# Patient Record
Sex: MALE
[De-identification: names, ages, dates, MRNs, and addresses within clinical notes are randomized per-mention and may not be internally consistent; named-entity substitution may affect disease eponyms.]

---

## 2017-06-14 PROBLEM — Z00.00 ENCOUNTER FOR PREVENTIVE HEALTH EXAMINATION: Status: ACTIVE | Noted: 2017-06-14

## 2017-06-15 ENCOUNTER — OTHER (OUTPATIENT)
Age: 22
End: 2017-06-15

## 2017-06-26 ENCOUNTER — APPOINTMENT (OUTPATIENT)
Dept: INTERNAL MEDICINE | Facility: CLINIC | Age: 22
End: 2017-06-26

## 2017-06-29 ENCOUNTER — LABORATORY RESULT (OUTPATIENT)
Age: 22
End: 2017-06-29

## 2017-06-29 ENCOUNTER — OTHER (OUTPATIENT)
Age: 22
End: 2017-06-29

## 2017-06-30 ENCOUNTER — APPOINTMENT (OUTPATIENT)
Dept: PEDIATRIC ALLERGY IMMUNOLOGY | Facility: CLINIC | Age: 22
End: 2017-06-30

## 2017-06-30 VITALS
SYSTOLIC BLOOD PRESSURE: 125 MMHG | HEIGHT: 65 IN | BODY MASS INDEX: 24.16 KG/M2 | OXYGEN SATURATION: 97 % | WEIGHT: 145 LBS | HEART RATE: 94 BPM | DIASTOLIC BLOOD PRESSURE: 81 MMHG

## 2017-06-30 DIAGNOSIS — Z86.39 PERSONAL HISTORY OF OTHER ENDOCRINE, NUTRITIONAL AND METABOLIC DISEASE: ICD-10-CM

## 2017-06-30 DIAGNOSIS — Z78.9 OTHER SPECIFIED HEALTH STATUS: ICD-10-CM

## 2017-06-30 DIAGNOSIS — Z82.49 FAMILY HISTORY OF ISCHEMIC HEART DISEASE AND OTHER DISEASES OF THE CIRCULATORY SYSTEM: ICD-10-CM

## 2017-06-30 DIAGNOSIS — Z80.1 FAMILY HISTORY OF MALIGNANT NEOPLASM OF TRACHEA, BRONCHUS AND LUNG: ICD-10-CM

## 2017-06-30 RX ORDER — POLYETHYLENE GLYCOL 3350 17 G
POWDER IN PACKET (EA) ORAL
Refills: 0 | Status: ACTIVE | COMMUNITY

## 2017-06-30 RX ORDER — MULTIVITAMIN
TABLET ORAL
Refills: 0 | Status: ACTIVE | COMMUNITY

## 2017-07-06 LAB
25(OH)D3 SERPL-MCNC: 32.6 NG/ML
ALBUMIN SERPL ELPH-MCNC: 4.8 G/DL
ALP BLD-CCNC: 83 U/L
ALT SERPL-CCNC: 14 U/L
ANION GAP SERPL CALC-SCNC: 15 MMOL/L
APPEARANCE: CLEAR
AST SERPL-CCNC: 13 U/L
BASOPHILS # BLD AUTO: 0.02 K/UL
BASOPHILS NFR BLD AUTO: 0.2 %
BILIRUB SERPL-MCNC: 0.3 MG/DL
BILIRUBIN URINE: NEGATIVE
BLOOD URINE: NEGATIVE
BUN SERPL-MCNC: 11 MG/DL
C TRACH RRNA SPEC QL NAA+PROBE: NORMAL
CALCIUM SERPL-MCNC: 9.9 MG/DL
CHLORIDE SERPL-SCNC: 103 MMOL/L
CHOLEST SERPL-MCNC: 177 MG/DL
CHOLEST/HDLC SERPL: 3.2 RATIO
CO2 SERPL-SCNC: 23 MMOL/L
COLOR: YELLOW
CREAT SERPL-MCNC: 0.81 MG/DL
EOSINOPHIL # BLD AUTO: 0.83 K/UL
EOSINOPHIL NFR BLD AUTO: 9.8 %
ESTRADIOL SERPL-MCNC: 78 PG/ML
FSH SERPL-MCNC: 2.4 IU/L
GLUCOSE QUALITATIVE U: NORMAL MG/DL
GLUCOSE SERPL-MCNC: 94 MG/DL
HAV IGG+IGM SER QL: REACTIVE
HBA1C MFR BLD HPLC: 5.5 %
HBV CORE IGG+IGM SER QL: NONREACTIVE
HBV SURFACE AB SERPL IA-ACNC: 954 MIU/ML
HBV SURFACE AG SER QL: NONREACTIVE
HCT VFR BLD CALC: 44.9 %
HCV AB SER QL: NONREACTIVE
HCV S/CO RATIO: 0.12 S/CO
HDLC SERPL-MCNC: 55 MG/DL
HGB BLD-MCNC: 14.3 G/DL
HIV1+2 AB SPEC QL IA.RAPID: NONREACTIVE
IMM GRANULOCYTES NFR BLD AUTO: 0.1 %
KETONES URINE: NEGATIVE
LDLC SERPL CALC-MCNC: 109 MG/DL
LEUKOCYTE ESTERASE URINE: ABNORMAL
LH SERPL-ACNC: 9.4 IU/L
LYMPHOCYTES # BLD AUTO: 2.12 K/UL
LYMPHOCYTES NFR BLD AUTO: 25.1 %
MAN DIFF?: NORMAL
MCHC RBC-ENTMCNC: 29.2 PG
MCHC RBC-ENTMCNC: 31.8 GM/DL
MCV RBC AUTO: 91.8 FL
MONOCYTES # BLD AUTO: 0.36 K/UL
MONOCYTES NFR BLD AUTO: 4.3 %
N GONORRHOEA RRNA SPEC QL NAA+PROBE: NORMAL
NEUTROPHILS # BLD AUTO: 5.1 K/UL
NEUTROPHILS NFR BLD AUTO: 60.5 %
NITRITE URINE: NEGATIVE
PH URINE: 6.5
PLATELET # BLD AUTO: 284 K/UL
POTASSIUM SERPL-SCNC: 4.8 MMOL/L
PROT SERPL-MCNC: 7.5 G/DL
PROTEIN URINE: NEGATIVE MG/DL
RBC # BLD: 4.89 M/UL
RBC # FLD: 13.2 %
SODIUM SERPL-SCNC: 141 MMOL/L
SOURCE AMPLIFICATION: NORMAL
SPECIFIC GRAVITY URINE: 1.03
T PALLIDUM AB SER QL IA: NEGATIVE
TESTOST SERPL-MCNC: 56.3 NG/DL
TRIGL SERPL-MCNC: 65 MG/DL
TSH SERPL-ACNC: 1.4 UIU/ML
UROBILINOGEN URINE: NORMAL MG/DL
WBC # FLD AUTO: 8.44 K/UL

## 2017-07-07 ENCOUNTER — TRANSCRIPTION ENCOUNTER (OUTPATIENT)
Age: 22
End: 2017-07-07

## 2017-07-10 LAB — DHEA-SULFATE, SERUM: 392 UG/DL

## 2017-07-21 ENCOUNTER — OTHER (OUTPATIENT)
Age: 22
End: 2017-07-21

## 2017-08-04 ENCOUNTER — TRANSCRIPTION ENCOUNTER (OUTPATIENT)
Age: 22
End: 2017-08-04

## 2017-09-08 ENCOUNTER — APPOINTMENT (OUTPATIENT)
Dept: ENDOCRINOLOGY | Facility: CLINIC | Age: 22
End: 2017-09-08
Payer: COMMERCIAL

## 2017-09-08 VITALS
DIASTOLIC BLOOD PRESSURE: 78 MMHG | HEIGHT: 65 IN | SYSTOLIC BLOOD PRESSURE: 120 MMHG | WEIGHT: 148 LBS | BODY MASS INDEX: 24.66 KG/M2 | HEART RATE: 105 BPM | OXYGEN SATURATION: 98 %

## 2017-09-08 PROCEDURE — 99204 OFFICE O/P NEW MOD 45 MIN: CPT

## 2017-09-08 RX ORDER — OMEGA-3/DHA/EPA/FISH OIL 300-1000MG
1000 CAPSULE ORAL
Refills: 0 | Status: ACTIVE | COMMUNITY
Start: 2017-09-08

## 2017-09-11 ENCOUNTER — APPOINTMENT (OUTPATIENT)
Dept: ENDOCRINOLOGY | Facility: CLINIC | Age: 22
End: 2017-09-11
Payer: COMMERCIAL

## 2017-09-11 PROCEDURE — 96372 THER/PROPH/DIAG INJ SC/IM: CPT

## 2017-09-11 RX ADMIN — TESTOSTERONE ENANTHATE 0 MG/ML: 200 INJECTION, SOLUTION INTRAMUSCULAR at 00:00

## 2017-09-12 RX ORDER — TESTOSTERONE ENANTHATE 200 MG/ML
200 INJECTION, SOLUTION INTRAMUSCULAR
Refills: 0 | Status: COMPLETED | OUTPATIENT
Start: 2017-09-11

## 2017-09-18 ENCOUNTER — LABORATORY RESULT (OUTPATIENT)
Age: 22
End: 2017-09-18

## 2017-09-19 LAB
BASOPHILS # BLD AUTO: 0.03 K/UL
BASOPHILS NFR BLD AUTO: 0.5 %
EOSINOPHIL # BLD AUTO: 0.72 K/UL
EOSINOPHIL NFR BLD AUTO: 10.9 %
HCT VFR BLD CALC: 44.2 %
HGB BLD-MCNC: 14.2 G/DL
IMM GRANULOCYTES NFR BLD AUTO: 0.2 %
LYMPHOCYTES # BLD AUTO: 2.46 K/UL
LYMPHOCYTES NFR BLD AUTO: 37.4 %
MAN DIFF?: NORMAL
MCHC RBC-ENTMCNC: 28.7 PG
MCHC RBC-ENTMCNC: 32.1 GM/DL
MCV RBC AUTO: 89.3 FL
MONOCYTES # BLD AUTO: 0.31 K/UL
MONOCYTES NFR BLD AUTO: 4.7 %
NEUTROPHILS # BLD AUTO: 3.05 K/UL
NEUTROPHILS NFR BLD AUTO: 46.3 %
PLATELET # BLD AUTO: 290 K/UL
RBC # BLD: 4.95 M/UL
RBC # FLD: 12.8 %
TESTOST SERPL-MCNC: 563.7 NG/DL
WBC # FLD AUTO: 6.58 K/UL

## 2017-09-25 ENCOUNTER — APPOINTMENT (OUTPATIENT)
Dept: ENDOCRINOLOGY | Facility: CLINIC | Age: 22
End: 2017-09-25
Payer: COMMERCIAL

## 2017-09-25 PROCEDURE — 96372 THER/PROPH/DIAG INJ SC/IM: CPT

## 2017-09-25 RX ADMIN — TESTOSTERONE ENANTHATE 0 MG/ML: 200 INJECTION, SOLUTION INTRAMUSCULAR at 00:00

## 2017-09-26 RX ORDER — TESTOSTERONE ENANTHATE 200 MG/ML
200 INJECTION, SOLUTION INTRAMUSCULAR
Refills: 0 | Status: COMPLETED | OUTPATIENT
Start: 2017-09-25

## 2017-10-06 ENCOUNTER — APPOINTMENT (OUTPATIENT)
Dept: ENDOCRINOLOGY | Facility: CLINIC | Age: 22
End: 2017-10-06
Payer: COMMERCIAL

## 2017-10-06 VITALS
DIASTOLIC BLOOD PRESSURE: 70 MMHG | WEIGHT: 144 LBS | HEIGHT: 65 IN | HEART RATE: 97 BPM | SYSTOLIC BLOOD PRESSURE: 102 MMHG | OXYGEN SATURATION: 98 % | BODY MASS INDEX: 23.99 KG/M2

## 2017-10-06 PROCEDURE — 99213 OFFICE O/P EST LOW 20 MIN: CPT

## 2017-10-09 ENCOUNTER — APPOINTMENT (OUTPATIENT)
Dept: ENDOCRINOLOGY | Facility: CLINIC | Age: 22
End: 2017-10-09
Payer: COMMERCIAL

## 2017-10-09 PROCEDURE — 96372 THER/PROPH/DIAG INJ SC/IM: CPT

## 2017-10-09 RX ADMIN — TESTOSTERONE ENANTHATE 0 MG/ML: 200 INJECTION, SOLUTION INTRAMUSCULAR at 00:00

## 2017-10-10 RX ORDER — TESTOSTERONE ENANTHATE 200 MG/ML
200 INJECTION, SOLUTION INTRAMUSCULAR
Refills: 0 | Status: COMPLETED | OUTPATIENT
Start: 2017-10-09

## 2017-10-11 ENCOUNTER — APPOINTMENT (OUTPATIENT)
Dept: ENDOCRINOLOGY | Facility: CLINIC | Age: 22
End: 2017-10-11

## 2017-10-18 ENCOUNTER — TRANSCRIPTION ENCOUNTER (OUTPATIENT)
Age: 22
End: 2017-10-18

## 2017-10-19 ENCOUNTER — TRANSCRIPTION ENCOUNTER (OUTPATIENT)
Age: 22
End: 2017-10-19

## 2017-10-20 LAB
BASOPHILS # BLD AUTO: 0.02 K/UL
BASOPHILS NFR BLD AUTO: 0.3 %
EOSINOPHIL # BLD AUTO: 0.71 K/UL
EOSINOPHIL NFR BLD AUTO: 9.1 %
HCT VFR BLD CALC: 48.4 %
HGB BLD-MCNC: 16 G/DL
IMM GRANULOCYTES NFR BLD AUTO: 0.1 %
LYMPHOCYTES # BLD AUTO: 2.51 K/UL
LYMPHOCYTES NFR BLD AUTO: 32.1 %
MAN DIFF?: NORMAL
MCHC RBC-ENTMCNC: 30.3 PG
MCHC RBC-ENTMCNC: 33.1 GM/DL
MCV RBC AUTO: 91.7 FL
MONOCYTES # BLD AUTO: 0.34 K/UL
MONOCYTES NFR BLD AUTO: 4.3 %
NEUTROPHILS # BLD AUTO: 4.23 K/UL
NEUTROPHILS NFR BLD AUTO: 54.1 %
PLATELET # BLD AUTO: 252 K/UL
RBC # BLD: 5.28 M/UL
RBC # FLD: 13.1 %
TESTOST SERPL-MCNC: 232.7 NG/DL
WBC # FLD AUTO: 7.82 K/UL

## 2017-10-23 ENCOUNTER — APPOINTMENT (OUTPATIENT)
Dept: ENDOCRINOLOGY | Facility: CLINIC | Age: 22
End: 2017-10-23
Payer: COMMERCIAL

## 2017-10-23 PROCEDURE — 96372 THER/PROPH/DIAG INJ SC/IM: CPT

## 2017-10-23 RX ORDER — TESTOSTERONE ENANTHATE 200 MG/ML
200 INJECTION, SOLUTION INTRAMUSCULAR
Refills: 0 | Status: COMPLETED | OUTPATIENT
Start: 2017-10-23

## 2017-10-23 RX ADMIN — TESTOSTERONE ENANTHATE 0 MG/ML: 200 INJECTION, SOLUTION INTRAMUSCULAR at 00:00

## 2017-10-30 ENCOUNTER — MEDICATION RENEWAL (OUTPATIENT)
Age: 22
End: 2017-10-30

## 2017-11-07 ENCOUNTER — TRANSCRIPTION ENCOUNTER (OUTPATIENT)
Age: 22
End: 2017-11-07

## 2017-11-08 RX ORDER — NEEDLES, SAFETY 18GX1 1/2"
21G X 1" NEEDLE, DISPOSABLE MISCELLANEOUS
Qty: 1 | Refills: 5 | Status: DISCONTINUED | COMMUNITY
Start: 2017-09-25 | End: 2017-11-08

## 2017-11-22 ENCOUNTER — TRANSCRIPTION ENCOUNTER (OUTPATIENT)
Age: 22
End: 2017-11-22

## 2017-11-29 ENCOUNTER — TRANSCRIPTION ENCOUNTER (OUTPATIENT)
Age: 22
End: 2017-11-29

## 2017-12-08 ENCOUNTER — APPOINTMENT (OUTPATIENT)
Dept: ENDOCRINOLOGY | Facility: CLINIC | Age: 22
End: 2017-12-08

## 2017-12-28 ENCOUNTER — TRANSCRIPTION ENCOUNTER (OUTPATIENT)
Age: 22
End: 2017-12-28

## 2018-01-25 ENCOUNTER — TRANSCRIPTION ENCOUNTER (OUTPATIENT)
Age: 23
End: 2018-01-25

## 2018-01-30 ENCOUNTER — TRANSCRIPTION ENCOUNTER (OUTPATIENT)
Age: 23
End: 2018-01-30

## 2018-02-06 ENCOUNTER — TRANSCRIPTION ENCOUNTER (OUTPATIENT)
Age: 23
End: 2018-02-06

## 2018-02-14 ENCOUNTER — TRANSCRIPTION ENCOUNTER (OUTPATIENT)
Age: 23
End: 2018-02-14

## 2018-02-26 ENCOUNTER — TRANSCRIPTION ENCOUNTER (OUTPATIENT)
Age: 23
End: 2018-02-26

## 2018-03-27 ENCOUNTER — OTHER (OUTPATIENT)
Age: 23
End: 2018-03-27

## 2018-07-12 ENCOUNTER — OTHER (OUTPATIENT)
Age: 23
End: 2018-07-12

## 2020-06-19 ENCOUNTER — TRANSCRIPTION ENCOUNTER (OUTPATIENT)
Age: 25
End: 2020-06-19

## 2021-01-22 ENCOUNTER — APPOINTMENT (OUTPATIENT)
Dept: RADIOLOGY | Facility: IMAGING CENTER | Age: 26
End: 2021-01-22
Payer: COMMERCIAL

## 2021-01-22 ENCOUNTER — OUTPATIENT (OUTPATIENT)
Dept: OUTPATIENT SERVICES | Facility: HOSPITAL | Age: 26
LOS: 1 days | End: 2021-01-22
Payer: COMMERCIAL

## 2021-01-22 DIAGNOSIS — R62.52 SHORT STATURE (CHILD): ICD-10-CM

## 2021-01-22 PROCEDURE — 73552 X-RAY EXAM OF FEMUR 2/>: CPT

## 2021-01-22 PROCEDURE — 73552 X-RAY EXAM OF FEMUR 2/>: CPT | Mod: 26,50

## 2021-03-02 ENCOUNTER — OUTPATIENT (OUTPATIENT)
Dept: OUTPATIENT SERVICES | Facility: HOSPITAL | Age: 26
LOS: 1 days | End: 2021-03-02
Payer: COMMERCIAL

## 2021-03-02 ENCOUNTER — APPOINTMENT (OUTPATIENT)
Dept: RADIOLOGY | Facility: IMAGING CENTER | Age: 26
End: 2021-03-02
Payer: COMMERCIAL

## 2021-03-02 DIAGNOSIS — R62.52 SHORT STATURE (CHILD): ICD-10-CM

## 2021-03-02 PROCEDURE — 73552 X-RAY EXAM OF FEMUR 2/>: CPT | Mod: 26,50

## 2021-03-02 PROCEDURE — 73552 X-RAY EXAM OF FEMUR 2/>: CPT

## 2021-04-03 ENCOUNTER — APPOINTMENT (OUTPATIENT)
Dept: RADIOLOGY | Facility: IMAGING CENTER | Age: 26
End: 2021-04-03
Payer: COMMERCIAL

## 2021-04-03 ENCOUNTER — OUTPATIENT (OUTPATIENT)
Dept: OUTPATIENT SERVICES | Facility: HOSPITAL | Age: 26
LOS: 1 days | End: 2021-04-03
Payer: COMMERCIAL

## 2021-04-03 DIAGNOSIS — Z00.8 ENCOUNTER FOR OTHER GENERAL EXAMINATION: ICD-10-CM

## 2021-04-03 PROCEDURE — 73552 X-RAY EXAM OF FEMUR 2/>: CPT

## 2021-04-03 PROCEDURE — 73552 X-RAY EXAM OF FEMUR 2/>: CPT | Mod: 26,50

## 2021-07-22 ENCOUNTER — NON-APPOINTMENT (OUTPATIENT)
Age: 26
End: 2021-07-22

## 2021-07-22 ENCOUNTER — APPOINTMENT (OUTPATIENT)
Dept: TRANSGENDER CARE | Facility: CLINIC | Age: 26
End: 2021-07-22

## 2021-07-23 ENCOUNTER — LABORATORY RESULT (OUTPATIENT)
Age: 26
End: 2021-07-23

## 2021-07-23 ENCOUNTER — APPOINTMENT (OUTPATIENT)
Dept: TRANSGENDER CARE | Facility: CLINIC | Age: 26
End: 2021-07-23
Payer: COMMERCIAL

## 2021-07-23 VITALS
SYSTOLIC BLOOD PRESSURE: 110 MMHG | BODY MASS INDEX: 23.04 KG/M2 | HEART RATE: 95 BPM | OXYGEN SATURATION: 98 % | WEIGHT: 152 LBS | DIASTOLIC BLOOD PRESSURE: 80 MMHG | HEIGHT: 68 IN | TEMPERATURE: 98 F

## 2021-07-23 PROCEDURE — 36415 COLL VENOUS BLD VENIPUNCTURE: CPT

## 2021-07-23 PROCEDURE — 99205 OFFICE O/P NEW HI 60 MIN: CPT | Mod: 25

## 2021-07-23 PROCEDURE — 99072 ADDL SUPL MATRL&STAF TM PHE: CPT

## 2021-07-23 NOTE — HISTORY OF PRESENT ILLNESS
[FreeTextEntry1] : GARY TERRAZAS is a 26 year old, transmale seen on 07/23/2021 for  Trans care.\par \par Preferred Pronouns: he/him\par Sexual orientation: straight\par Gender identity: transmale\par Assigned female at birth\par Terms for Body parts: medical ones\par Call pt: Gary\par \par Pt was seen here intially in 2017 and was in care with Dr. Lujan after seeing me then, but moved to [place] in 2018, when he found a new endocrinologist in [place]. \par \par The Pt was taking Testosterone cypionate 200 mg/ml at 0.25 ml weekly, and his testosterone level was 600-700 and Hgb was 16.6 mg/dl.  His endocrinologist changed dose to testosterone cypionate 200 mg/ml 0.20 ml weekly, and intially the Hgb decreased, but his testosterone level was closer to 300-400.  So they increased levels back to 200 mg/ml 0.23 ml weekly, and he has been donating blood once every 2 months for the past 6 months.  \par \par Currently, the patient is feeling well. Has no complaints except for hair loss, and feels good.  He' shere today to reestablish care at the center and also to have labs checked today.\par \par Transition history (Social, Endo, Surgical):\par Pt knew at a very young age that he was male. Since elementary school or before that, he refused to wear skirts and girls clothes even as a 3 and 4 year old. He's always dressed male like. The patient learned the word "transgender" and came out around age 12. The family was supportive and not really surprised. Mom and dad were initially sad, but eventually they thought that he was a normal kid after they spoke with their American friends. Dad does not support starting hormone therapy, but it is "up to him" after he starts work and can pay for therapy by himself. The patient has [employer] student insurance, and it expires in mid-August. Lives with his mom, who is supportive. \par \par Pt went to [employer] and graduated and is living with his mom.\par \par Born: [place] [facility]. Imm[place]rated to [place] 2009. \par • Transition HX + Present Life: Gary felt he was a boy from a young age but didn’t know what it meant. He repressed feelings of gender identity until 2015. Recognized he identified with masculinity and came out to family. His family was not supportive, and it took them a month to reach out to him. They are now affirming and encouraging. He has no siblings. He has known his wife for 6 years,  for 1.5 yrs. She didn’t he was trans at first as he had top surgery when they met. He eventually came out to her and she was understanding. He moved to [place] last year and recently returned to [place]. His wife still lives in [place]. He now lives with his parents. Confirmed he is safe, has food security and reliable transportation. \par \par • Education | Employment: Holds a bachelor’s degree in accounting and business. Employed full time as a . Works remotely. Not out at work. \par \par • Mental Health: Denies mental health, behavioral or developmental concerns. No mental health engagement. He is not interested in talk therapy. Denies inpatient/outpatient admissions. No psych medications. Denies SI/HI or plans. Denies hx of violence. Denies sleeping problems. Denies notable family psych hx. \par \par • Endocrinology & Primary Care: On GAHT for over 4 years. [facility] then pt relocated for work. . Reports he may need to adjust dosing.  No PCP/GYN care. \par \par • Coping: Receives emotional support from family, wife, and friends. \par \par • Feelings of Gender Dysphoria/ Body Dysmorphia: Denies experiencing gender dysphoria or body dysmorphia. \par \par • Gender Presentation: Presents masculine. Had top surgery, no packing. \par \par • Tattoos/ Piercing: None. \par \par • Cigarette, Vaping, Marijuana, Alcohol, and Drug Use: No use. \par \par • Reproductive Endocrinology: He is not sure if he wants to be a parent or have biological children.   Does not want to become pregnant , and is not intersted in egg banking. \par \par • Gender Affirming Surgery: Top surgery in 2015, Dr. Clemente Quinteros. Satisfied with results. No plans for future surgeries. \par \par • Name Change + Gender Marker: Completed legal name change in 2015. Interested in gender marker change. \par \par • Nutrition: Denies nutritional concerns, appetite problems or hx of ED. 3 meals per day. No exercise, 5’8 150lbs. \par \par • Sexual Health: Identifies as heterosexual. In a monogamous marriage (1.5 yr) with a cisgender woman.  now; she is in [place].  He is not sexually active. He has never been pregnant. No interest in PrEP. \par \par Last blood donation 1 mo ago. \par \par Existing provider team: \par GYN: last 2013 - prior to top surgery\par Psychiatry: never \par Therapist: one time before top surgery as a requirment before surgery

## 2021-07-23 NOTE — SOCIAL HISTORY
[Sexually Active] : The patient is sexually active [Monogamous] : monogamous [Never] : never [Oral-Receptive (pt. mouth)] : oral-receptive (pt. mouth) [To Pt Genitalia] : pt genitalia [Female ___] : [unfilled] female [Date of most recent sexual encounter ___] : ~His/Her~ most recent sexual encounter was [unfilled] [Partnership for Health – Safe Sex Evidence Based Intervention] : The Partnership for Health Safe Sex Evidence Based Intervention was applied [Loss Frame Message] :  and a loss frame message was provided. [de-identified] : monogmous with wife x 2 years

## 2021-08-27 LAB
ALBUMIN SERPL ELPH-MCNC: 4.7 G/DL
ALP BLD-CCNC: 140 U/L
ALT SERPL-CCNC: 17 U/L
ANION GAP SERPL CALC-SCNC: 13 MMOL/L
APPEARANCE: CLEAR
AST SERPL-CCNC: 16 U/L
BASOPHILS # BLD AUTO: 0.03 K/UL
BASOPHILS NFR BLD AUTO: 0.5 %
BILIRUB SERPL-MCNC: 0.5 MG/DL
BILIRUBIN URINE: NEGATIVE
BLOOD URINE: NEGATIVE
BUN SERPL-MCNC: 10 MG/DL
C TRACH RRNA SPEC QL NAA+PROBE: NOT DETECTED
C TRACH RRNA SPEC QL NAA+PROBE: NOT DETECTED
CALCIUM SERPL-MCNC: 9.9 MG/DL
CHLORIDE SERPL-SCNC: 100 MMOL/L
CHOLEST SERPL-MCNC: 194 MG/DL
CO2 SERPL-SCNC: 25 MMOL/L
COLOR: COLORLESS
CREAT SERPL-MCNC: 0.88 MG/DL
DHEA-SULFATE, SERUM: 350 UG/DL
EOSINOPHIL # BLD AUTO: 0.37 K/UL
EOSINOPHIL NFR BLD AUTO: 6.1 %
ESTIMATED AVERAGE GLUCOSE: 108 MG/DL
ESTRADIOL SERPL-MCNC: 13 PG/ML
GLUCOSE QUALITATIVE U: NEGATIVE
GLUCOSE SERPL-MCNC: 89 MG/DL
HBA1C MFR BLD HPLC: 5.4 %
HBV CORE IGG+IGM SER QL: NONREACTIVE
HBV SURFACE AB SERPL IA-ACNC: 578.9 MIU/ML
HBV SURFACE AG SER QL: NONREACTIVE
HCT VFR BLD CALC: 49.9 %
HCV AB SER QL: NONREACTIVE
HCV S/CO RATIO: 0.13 S/CO
HDLC SERPL-MCNC: 50 MG/DL
HEPATITIS A IGG ANTIBODY: REACTIVE
HGB BLD-MCNC: 15.9 G/DL
HIV1+2 AB SPEC QL IA.RAPID: NONREACTIVE
IMM GRANULOCYTES NFR BLD AUTO: 0.3 %
KETONES URINE: NEGATIVE
LDLC SERPL CALC-MCNC: 130 MG/DL
LEUKOCYTE ESTERASE URINE: NEGATIVE
LH SERPL-ACNC: 0.3 IU/L
LYMPHOCYTES # BLD AUTO: 1.5 K/UL
LYMPHOCYTES NFR BLD AUTO: 24.6 %
MAN DIFF?: NORMAL
MCHC RBC-ENTMCNC: 28.1 PG
MCHC RBC-ENTMCNC: 31.9 GM/DL
MCV RBC AUTO: 88.3 FL
MONOCYTES # BLD AUTO: 0.29 K/UL
MONOCYTES NFR BLD AUTO: 4.8 %
N GONORRHOEA RRNA SPEC QL NAA+PROBE: NOT DETECTED
N GONORRHOEA RRNA SPEC QL NAA+PROBE: NOT DETECTED
NEUTROPHILS # BLD AUTO: 3.89 K/UL
NEUTROPHILS NFR BLD AUTO: 63.7 %
NITRITE URINE: NEGATIVE
NONHDLC SERPL-MCNC: 144 MG/DL
PH URINE: 7.5
PLATELET # BLD AUTO: 297 K/UL
POTASSIUM SERPL-SCNC: 4.6 MMOL/L
PROT SERPL-MCNC: 7.5 G/DL
PROTEIN URINE: NEGATIVE
RBC # BLD: 5.65 M/UL
RBC # FLD: 13.2 %
SODIUM SERPL-SCNC: 138 MMOL/L
SOURCE AMPLIFICATION: NORMAL
SOURCE ORAL: NORMAL
SPECIFIC GRAVITY URINE: 1
T PALLIDUM AB SER QL IA: NEGATIVE
TESTOST SERPL-MCNC: 458 NG/DL
TRIGL SERPL-MCNC: 67 MG/DL
TSH SERPL-ACNC: 1.07 UIU/ML
UROBILINOGEN URINE: NORMAL
WBC # FLD AUTO: 6.1 K/UL

## 2021-09-20 ENCOUNTER — APPOINTMENT (OUTPATIENT)
Dept: TRANSGENDER CARE | Facility: CLINIC | Age: 26
End: 2021-09-20
Payer: COMMERCIAL

## 2021-09-20 LAB
25(OH)D3 SERPL-MCNC: 81 NG/ML
FSH SERPL-MCNC: 0.7 IU/L

## 2021-09-20 PROCEDURE — 99215 OFFICE O/P EST HI 40 MIN: CPT | Mod: 95

## 2021-09-20 RX ORDER — CHOLECALCIFEROL (VITAMIN D3) 50 MCG
50 MCG TABLET ORAL
Qty: 30 | Refills: 3 | Status: ACTIVE | COMMUNITY
Start: 2021-09-20 | End: 1900-01-01

## 2021-09-20 NOTE — SOCIAL HISTORY
[Sexually Active] : The patient is sexually active [Monogamous] : monogamous [Never] : never [Oral-Receptive (pt. mouth)] : oral-receptive (pt. mouth) [To Pt Genitalia] : pt genitalia [Female ___] : [unfilled] female [Date of most recent sexual encounter ___] : ~His/Her~ most recent sexual encounter was [unfilled] [Partnership for Health – Safe Sex Evidence Based Intervention] : The Partnership for Health Safe Sex Evidence Based Intervention was applied [Loss Frame Message] :  and a loss frame message was provided. [de-identified] : monogmous with wife x 2 years

## 2021-09-20 NOTE — DISCUSSION/SUMMARY
[15 min] : 15 min [HIV Education] : HIV Education [Universal Precautions] : universal precautions [Treatment Education] : treatment education [Treatment Adherence] : treatment adherence [Anticipatory Guidance] : anticipatory guidance [Prognosis] : prognosis [Risk Reduction] : risk reduction [Pre-conception Counseling] : pre-conception counseling

## 2021-09-20 NOTE — PHYSICAL EXAM
[Alert] : alert [Well Nourished] : well nourished [Healthy Appearance] : healthy appearance [No Acute Distress] : no acute distress [Well Developed] : well developed [Normal Pupil & Iris Size/Symmetry] : normal pupil and iris size and symmetry [No Discharge] : no discharge [No Photophobia] : no photophobia [Sclera Not Icteric] : sclera not icteric [Normal Nasal Mucosa] : the nasal mucosa was normal [Normal Lips/Tongue] : the lips and tongue were normal [Normal Outer Ear/Nose] : the ears and nose were normal in appearance [Supple] : the neck was supple [Skin Intact] : skin intact  [No Rash] : no rash [No Skin Lesions] : no skin lesions [No clubbing] : no clubbing [No Edema] : no edema [No Cyanosis] : no cyanosis [Normal Mood] : mood was normal [Normal Affect] : affect was normal [Judgment and Insight Age Appropriate] : judgement and insight is age appropriate [Alert, Awake, Oriented as Age-Appropriate] : alert, awake, oriented as age appropriate [Pale mucosa] : no pale mucosa

## 2021-09-20 NOTE — HISTORY OF PRESENT ILLNESS
[Home] : at home, [unfilled] , at the time of the visit. [Medical Office: ([facility])___] : at the medical office located in  [Verbal consent obtained from patient] : the patient, [unfilled] [FreeTextEntry1] : KATHY TERRAZAS is a 26 year old, transmale seen on 09/20/2021 for followup.\par \par The Pt was taking Testosterone cypionate 200 mg/ml at 0.25 ml weekly, and his testosterone level was 600-700 and Hgb was 16.6 mg/dl. His endocrinologist changed dose to testosterone cypionate 200 mg/ml 0.20 ml weekly, and intially the Hgb decreased, but his testosterone level was closer to 300-400. So they increased levels back to 200 mg/ml 0.23 ml weekly, and he has been donating blood once every 2 months for the past 6 months. \par \par On Friday, 7/23/21 Testosterone level was 468 ng/dl ; Hgb was 15.9 g/dl\par Pt is injectiong 0.21-0.23 ml every week of Testosterone cypronate 200 mg/ml weekly.\par No problems with the injections.\par Last injection was on Wednesday 9/15/21 (he usually injects on Tuesdays) \par Last donated blood: mid-Aug 2021\par \par Work/Life balance: Pt feels well.  Works 1 pm to 8 pm from home.  Wakes up around noon. He sometimes feels tired due to this schedule.  He goes to bed at 4 am.   Between 8 pm to 4 am he watches TV and spends time with his family, and then he reads books or plays games. He likes a lot of down time at night to unwind and relax, and cannot do this before work, based on his personality.   \par \par Going to [place] for 3-4 mo from Oct 2021 to Feb 2022.  with parents. \par \par Still , but  from wife at this time. Not sexually active. \par

## 2021-10-08 ENCOUNTER — APPOINTMENT (OUTPATIENT)
Dept: TRANSGENDER CARE | Facility: CLINIC | Age: 26
End: 2021-10-08
Payer: COMMERCIAL

## 2021-10-08 PROCEDURE — 99214 OFFICE O/P EST MOD 30 MIN: CPT | Mod: 95

## 2021-10-08 NOTE — HISTORY OF PRESENT ILLNESS
[Home] : at home, [unfilled] , at the time of the visit. [Medical Office: ([facility])___] : at the medical office located in  [Verbal consent obtained from patient] : the patient, [unfilled] [FreeTextEntry1] : KATHY TERRAZAS is a 26 year old, transmale seen on 10/8/21 for followup.\par \par The Pt was taking Testosterone cypionate 200 mg/ml at 0.25 ml weekly, and his testosterone level was 600-700 and Hgb was 16.6 mg/dl. His endocrinologist changed dose to testosterone cypionate 200 mg/ml 0.20 ml weekly, and intially the Hgb decreased, but his testosterone level was closer to 300-400. So they increased levels back to 200 mg/ml 0.23 ml weekly, and he has been donating blood once every 2 months for the past 6 months. \par \par On Friday, 7/23/21 Testosterone level was 468 ng/dl ; Hgb was 15.9 g/dl\par Pt is injectiong 0.21-0.23 ml every week of Testosterone cypronate 200 mg/ml weekly.\par No problems with the injections.\par Last injection was on Wednesday 9/15/21 (he usually injects on Tuesdays) \par Last donated blood: mid-Aug 2021\par \par \par On Friday, 10/2/21 Testosterone level was 304 ng/dl; Hbg was 15.0 g/dl\par Pt is still injecting 0.21 ml every week of Testoerone cypronate, but is also putting in 0.02 ml of air into the muscle (he was advised to do this at a previous doctor's office).  No problems with injections.\par Last injection prior to labs was at midnight between 9/28/2021 and 9/29/2021.  (he usually injects on Tuesday at night around midnight almost Wednessday).\par \par Pt is here today to discuss labs and testoterone dosing.   He's worried his level is so low. \par \par \par He is planning on having tibial legnthening surgery in 2 weeks, and will be in a wheelchair for 2-3 months.

## 2021-10-13 LAB
25(OH)D3 SERPL-MCNC: 74.5 NG/ML
BASOPHILS # BLD AUTO: 0.04 K/UL
BASOPHILS NFR BLD AUTO: 0.7 %
CHOLEST SERPL-MCNC: 202 MG/DL
EOSINOPHIL # BLD AUTO: 0.63 K/UL
EOSINOPHIL NFR BLD AUTO: 11 %
HCT VFR BLD CALC: 47.2 %
HDLC SERPL-MCNC: 54 MG/DL
HGB BLD-MCNC: 15.1 G/DL
IMM GRANULOCYTES NFR BLD AUTO: 0.2 %
LDLC SERPL CALC-MCNC: 135 MG/DL
LYMPHOCYTES # BLD AUTO: 2 K/UL
LYMPHOCYTES NFR BLD AUTO: 35 %
MAN DIFF?: NORMAL
MCHC RBC-ENTMCNC: 28.5 PG
MCHC RBC-ENTMCNC: 32 GM/DL
MCV RBC AUTO: 89.1 FL
MONOCYTES # BLD AUTO: 0.25 K/UL
MONOCYTES NFR BLD AUTO: 4.4 %
NEUTROPHILS # BLD AUTO: 2.78 K/UL
NEUTROPHILS NFR BLD AUTO: 48.7 %
NONHDLC SERPL-MCNC: 147 MG/DL
PLATELET # BLD AUTO: 270 K/UL
RBC # BLD: 5.3 M/UL
RBC # FLD: 13.3 %
SHBG SERPL-SCNC: 10.4 NMOL/L
TESTOST SERPL-MCNC: 304 NG/DL
TRIGL SERPL-MCNC: 63 MG/DL
WBC # FLD AUTO: 5.71 K/UL

## 2022-05-13 ENCOUNTER — NON-APPOINTMENT (OUTPATIENT)
Age: 27
End: 2022-05-13

## 2022-05-16 ENCOUNTER — LABORATORY RESULT (OUTPATIENT)
Age: 27
End: 2022-05-16

## 2022-05-17 LAB
APPEARANCE: ABNORMAL
BILIRUBIN URINE: NEGATIVE
BLOOD URINE: NEGATIVE
COLOR: YELLOW
GLUCOSE QUALITATIVE U: NEGATIVE
HIV1+2 AB SPEC QL IA.RAPID: NONREACTIVE
KETONES URINE: NEGATIVE
LEUKOCYTE ESTERASE URINE: NEGATIVE
LH SERPL-ACNC: 0.4 IU/L
NITRITE URINE: NEGATIVE
PH URINE: 7.5
PROLACTIN SERPL-MCNC: 9.5 NG/ML
PROTEIN URINE: NEGATIVE
SPECIFIC GRAVITY URINE: 1.01
TESTOST SERPL-MCNC: 237 NG/DL
TSH SERPL-ACNC: 1.21 UIU/ML
UROBILINOGEN URINE: NORMAL

## 2022-05-19 LAB
ESTIMATED AVERAGE GLUCOSE: 117 MG/DL
HBA1C MFR BLD HPLC: 5.7 %

## 2022-05-23 ENCOUNTER — APPOINTMENT (OUTPATIENT)
Dept: TRANSGENDER CARE | Facility: CLINIC | Age: 27
End: 2022-05-23
Payer: COMMERCIAL

## 2022-05-23 LAB
25(OH)D3 SERPL-MCNC: 135 NG/ML
ALBUMIN SERPL ELPH-MCNC: 4.9 G/DL
ALP BLD-CCNC: 150 U/L
ALT SERPL-CCNC: 26 U/L
ANION GAP SERPL CALC-SCNC: 13 MMOL/L
AST SERPL-CCNC: 17 U/L
BILIRUB SERPL-MCNC: 0.3 MG/DL
BUN SERPL-MCNC: 15 MG/DL
CALCIUM SERPL-MCNC: 10.2 MG/DL
CHLORIDE SERPL-SCNC: 103 MMOL/L
CO2 SERPL-SCNC: 25 MMOL/L
CREAT SERPL-MCNC: 0.81 MG/DL
EGFR: 124 ML/MIN/1.73M2
ESTRADIOL SERPL-MCNC: 14 PG/ML
GLUCOSE SERPL-MCNC: 81 MG/DL
MONOMERIC PROLACTIN (ICMA)*: 7.06 NG/ML
PERCENT MACROPROLACTIN: 19 %
POTASSIUM SERPL-SCNC: 4.4 MMOL/L
PROLACTIN, SERUM (ICMA)*: 8.71 NG/ML
PROT SERPL-MCNC: 7.9 G/DL
SODIUM SERPL-SCNC: 140 MMOL/L

## 2022-05-23 PROCEDURE — 99214 OFFICE O/P EST MOD 30 MIN: CPT | Mod: 95

## 2022-05-23 NOTE — PHYSICAL EXAM
[Alert] : alert [Well Nourished] : well nourished [Healthy Appearance] : healthy appearance [No Acute Distress] : no acute distress [Well Developed] : well developed [Normal Pupil & Iris Size/Symmetry] : normal pupil and iris size and symmetry [No Discharge] : no discharge [No Photophobia] : no photophobia [Sclera Not Icteric] : sclera not icteric [Normal Lips/Tongue] : the lips and tongue were normal [Normal Outer Ear/Nose] : the ears and nose were normal in appearance [No Thrush] : no thrush [Supple] : the neck was supple [Skin Intact] : skin intact  [No Rash] : no rash [No Skin Lesions] : no skin lesions [No clubbing] : no clubbing [No Edema] : no edema [No Cyanosis] : no cyanosis [Normal Mood] : mood was normal [Normal Affect] : affect was normal [Judgment and Insight Age Appropriate] : judgement and insight is age appropriate [Alert, Awake, Oriented as Age-Appropriate] : alert, awake, oriented as age appropriate [Pale mucosa] : no pale mucosa

## 2022-05-23 NOTE — HISTORY OF PRESENT ILLNESS
[Home] : at home, [unfilled] , at the time of the visit. [Medical Office: ([facility])___] : at the medical office located in  [Verbal consent obtained from patient] : the patient, [unfilled] [FreeTextEntry1] : KATHY TERRAZAS is a 27 year old, transmale seen on 05/23/2022 for  Trans Care followup.\par \par Pt had leg legthening in Oct 2021 and finished in Feb 2022.  Pt still cannot stand or walk.  He's taking Xerelto currently.    He had bilateral tibial legnthening.  5.5 cm bilaterally. Bone broken and rods placed with healing of lesions.  Occured Nov 2021 through Feb 2022 as he was monitored in PT.  Dr Connelly in [place].  Still in a wheel chair; xray showing improvmenet but still not full healing. No weight bearing.  No formal PT now. \par \par The Pt was taking Testosterone cypionate 200 mg/ml at 0.25 ml weekly, and his testosterone level was 600-700 and Hgb was 16.6 mg/dl. His endocrinologist changed dose to testosterone cypionate 200 mg/ml 0.20 ml weekly, and intially the Hgb decreased, but his testosterone level was closer to 300-400. So they increased levels back to 200 mg/ml 0.23 ml weekly, and he has been donating blood once every 2 months for the past 6 months prior to July 2021. \par \par On Friday, 7/23/21 Testosterone level was 468 ng/dl ; Hgb was 15.9 g/dl\par Pt is injectiong 0.21-0.23 ml every week of Testosterone cypronate 200 mg/ml weekly.\par No problems with the injections.\par Last injection was on Wednesday 9/15/21 (he usually injects on Tuesdays) \par Last donated blood: mid-Aug 2021\par \par On Friday, 10/2/21 Testosterone level was 304 ng/dl; Hbg was 15.0 g/dl\par Pt is still injecting 0.21 ml every week of Testoerone cypronate, but is also putting in 0.02 ml of air into the muscle (he was advised to do this at a previous doctor's office). No problems with injections.\par Last injection prior to labs was at midnight between 9/28/2021 and 9/29/2021. (he usually injects on Tuesday at night around midnight almost Wednessday).\par \par Pt is injecting Teststerone cypronate 200 mg/ml 0.23 ml every week.  No problems with inejctions. \par Last inejction was May 11 (Wednesday night at midnight almost Thursday. )\par Labs are 4.5 days after Testosterone injection, on May 16,2022, levels were 237 ng/dl. and Hgb was 17 g/dl.  No blood donatoin.   Vit D was 135.0, taking high doses of Vit D 10,000 units daily, and Xerelto. High Alk Phos due to bone growth/healing. \par \par LDL high 157 mg/dl  but no exercise currently due to wheelchair bound. \par \par Plans to be able to start walkign again in July 2022 or sooner, hopefully.  Feeling okay.

## 2022-05-24 LAB
BASOPHILS # BLD AUTO: 0.03 K/UL
BASOPHILS NFR BLD AUTO: 0.5 %
CHOLEST SERPL-MCNC: 229 MG/DL
DHEA-SULFATE, SERUM: 397 UG/DL
EOSINOPHIL # BLD AUTO: 0.46 K/UL
EOSINOPHIL NFR BLD AUTO: 6.9 %
FSH SERPL-MCNC: 0.6 IU/L
HCT VFR BLD CALC: 53.7 %
HDLC SERPL-MCNC: 45 MG/DL
HGB BLD-MCNC: 17 G/DL
IMM GRANULOCYTES NFR BLD AUTO: 0.3 %
LDLC SERPL CALC-MCNC: 157 MG/DL
LYMPHOCYTES # BLD AUTO: 1.88 K/UL
LYMPHOCYTES NFR BLD AUTO: 28.4 %
MAN DIFF?: NORMAL
MCHC RBC-ENTMCNC: 27.7 PG
MCHC RBC-ENTMCNC: 31.7 GM/DL
MCV RBC AUTO: 87.6 FL
MONOCYTES # BLD AUTO: 0.35 K/UL
MONOCYTES NFR BLD AUTO: 5.3 %
NEUTROPHILS # BLD AUTO: 3.89 K/UL
NEUTROPHILS NFR BLD AUTO: 58.6 %
NONHDLC SERPL-MCNC: 184 MG/DL
PLATELET # BLD AUTO: 289 K/UL
RBC # BLD: 6.13 M/UL
RBC # FLD: 13.2 %
SHBG SERPL-SCNC: 7.9 NMOL/L
TRIGL SERPL-MCNC: 134 MG/DL
WBC # FLD AUTO: 6.63 K/UL

## 2022-08-01 ENCOUNTER — LABORATORY RESULT (OUTPATIENT)
Age: 27
End: 2022-08-01

## 2022-08-09 ENCOUNTER — APPOINTMENT (OUTPATIENT)
Dept: TRANSGENDER CARE | Facility: CLINIC | Age: 27
End: 2022-08-09

## 2022-08-09 PROCEDURE — 99213 OFFICE O/P EST LOW 20 MIN: CPT | Mod: 95

## 2022-08-12 NOTE — HISTORY OF PRESENT ILLNESS
[Home] : at home, [unfilled] , at the time of the visit. [Medical Office: ([facility])___] : at the medical office located in  [Verbal consent obtained from patient] : the patient, [unfilled] [FreeTextEntry1] : KATHY TERRAZAS is a 27 year old, transmale seen on 05/23/2022 for  Trans Care followup.\par \par Pt had leg legthening in Oct 2021 and finished in Feb 2022.   Can walk without assistance now.  Stopped taking Xerelto .  He had bilateral tibial legnthening.  5.5 cm bilaterally. Bone broken and rods placed with healing of lesions.  Occured Nov 2021 through Feb 2022 as he was monitored in PT.  Dr Connelly in [place].  Still in a wheel chair; xray showing near full healing. Cleared for weight bearing, but not intense exercise.  No formal PT now.   Will followup with Dr. Connelly with xrays until cleared.  \par \par The Pt was taking Testosterone cypionate 200 mg/ml at 0.25 ml weekly, and his testosterone level was 600-700 and Hgb was 16.6 mg/dl. His endocrinologist changed dose to testosterone cypionate 200 mg/ml 0.20 ml weekly, and intially the Hgb decreased, but his testosterone level was closer to 300-400. So they increased levels back to 200 mg/ml 0.23 ml weekly, and he has been donating blood once every 2 months for the past 6 months prior to July 2021. \par \par On Friday, 8/1/22 Testerone level was 411 ; Hgb was 15.0 g/dl  was Last dose 7/27/22 to 7/28/22\par Pt is injectiong 0.23 ml every week of Testosterone cypronate 200 mg/ml weekly.\par No problems with the injections.\par Last injection was on Wednesday 7/27/22 to 7/28/22\par Last donated blood: mid-Aug 2021\par \par LDL high 111 mg/dl  but no exercise currently due to wheelchair bound. \par

## 2022-11-17 LAB
25(OH)D3 SERPL-MCNC: 61.1 NG/ML
ALBUMIN SERPL ELPH-MCNC: 4.8 G/DL
ALP BLD-CCNC: 135 U/L
ALT SERPL-CCNC: 15 U/L
ANION GAP SERPL CALC-SCNC: 12 MMOL/L
AST SERPL-CCNC: 14 U/L
BASOPHILS # BLD AUTO: 0.03 K/UL
BASOPHILS NFR BLD AUTO: 0.4 %
BILIRUB SERPL-MCNC: 0.3 MG/DL
BUN SERPL-MCNC: 15 MG/DL
CALCIUM SERPL-MCNC: 9.6 MG/DL
CALCIUM SERPL-MCNC: 9.6 MG/DL
CHLORIDE SERPL-SCNC: 100 MMOL/L
CO2 SERPL-SCNC: 25 MMOL/L
CREAT SERPL-MCNC: 0.94 MG/DL
EGFR: 114 ML/MIN/1.73M2
EOSINOPHIL # BLD AUTO: 0.5 K/UL
EOSINOPHIL NFR BLD AUTO: 7.2 %
ESTRADIOL SERPL-MCNC: 34 PG/ML
GLUCOSE SERPL-MCNC: 86 MG/DL
HCT VFR BLD CALC: 46.8 %
HGB BLD-MCNC: 15.1 G/DL
IMM GRANULOCYTES NFR BLD AUTO: 0.1 %
LH SERPL-ACNC: <0.3 IU/L
LYMPHOCYTES # BLD AUTO: 2.77 K/UL
LYMPHOCYTES NFR BLD AUTO: 39.7 %
MAN DIFF?: NORMAL
MCHC RBC-ENTMCNC: 27.5 PG
MCHC RBC-ENTMCNC: 32.3 GM/DL
MCV RBC AUTO: 85.1 FL
MONOCYTES # BLD AUTO: 0.39 K/UL
MONOCYTES NFR BLD AUTO: 5.6 %
NEUTROPHILS # BLD AUTO: 3.27 K/UL
NEUTROPHILS NFR BLD AUTO: 47 %
PARATHYROID HORMONE INTACT: 24 PG/ML
PLATELET # BLD AUTO: 280 K/UL
POTASSIUM SERPL-SCNC: 4.7 MMOL/L
PROT SERPL-MCNC: 7.4 G/DL
RBC # BLD: 5.5 M/UL
RBC # FLD: 12.6 %
SODIUM SERPL-SCNC: 136 MMOL/L
TESTOST SERPL-MCNC: 719 NG/DL
WBC # FLD AUTO: 6.97 K/UL

## 2022-11-18 ENCOUNTER — APPOINTMENT (OUTPATIENT)
Dept: TRANSGENDER CARE | Facility: CLINIC | Age: 27
End: 2022-11-18

## 2022-11-18 VITALS
BODY MASS INDEX: 22.58 KG/M2 | HEART RATE: 83 BPM | SYSTOLIC BLOOD PRESSURE: 118 MMHG | HEIGHT: 68 IN | DIASTOLIC BLOOD PRESSURE: 84 MMHG | OXYGEN SATURATION: 98 % | WEIGHT: 149 LBS

## 2022-11-18 LAB
FSH SERPL-MCNC: <0.3 IU/L
MONOMERIC PROLACTIN (ICMA)*: 24.3 NG/ML
PERCENT MACROPROLACTIN: 30 %
PROLACTIN, SERUM (ICMA)*: 34.7 NG/ML
SHBG SERPL-SCNC: 15.7 NMOL/L

## 2022-11-18 PROCEDURE — 99214 OFFICE O/P EST MOD 30 MIN: CPT | Mod: 25

## 2022-11-18 PROCEDURE — 36415 COLL VENOUS BLD VENIPUNCTURE: CPT

## 2022-11-18 NOTE — HISTORY OF PRESENT ILLNESS
[FreeTextEntry1] : KATHY TERRAZAS is a 27 year old, male seen on 11/18/2022 for    Trans Care followup.\par \par Pt had leg legthening in Oct 2021 and finished in Feb 2022.   Can walk without assistance now.  Stopped taking Xerelto .  He had bilateral tibial legnthening.  5.5 cm bilaterally. Bone broken and rods placed with healing of lesions.  Occured Nov 2021 through Feb 2022 as he was monitored in PT.  Dr Connelly in [place].  Still in a wheel chair; xray showing near full healing. Cleared for weight bearing, but not intense exercise.  No formal PT now.   Will followup with Dr. Connelly with xrays until cleared.   Pt is now ambulating without assistance and is doing well. \par \par The Pt was taking Testosterone cypionate 200 mg/ml at 0.25 ml weekly, and his testosterone level was 600-700 and Hgb was 16.6 mg/dl. His endocrinologist changed dose to testosterone cypionate 200 mg/ml 0.20 ml weekly, and intially the Hgb decreased, but his testosterone level was closer to 300-400. So they increased levels back to 200 mg/ml 0.23 ml weekly, and he has been donating blood once every 2 months for the past 6 months prior to July 2021. \par \par On Friday, Nov 17, 2022 Testostrone level was 719; Hgb was 15.1 g/dl  was Last dose of testsoen was on Nov 14, 2022 (3 days prior to labs)\par Pt is injectiong 0.25 ml every week of Testosterone cypronate 200 mg/ml weekly.\par No problems with the injections.\par Last injection was on Wednesday Nov 14 2022\par Last donated blood: Oct 2022\par \par Pt is now fully ambulating without asistance and has been cleread by Dr. Connelly.   He is now 2 inchs taller after his leg legnthening surgery\par

## 2023-02-27 ENCOUNTER — LABORATORY RESULT (OUTPATIENT)
Age: 28
End: 2023-02-27

## 2023-03-06 ENCOUNTER — APPOINTMENT (OUTPATIENT)
Dept: TRANSGENDER CARE | Facility: CLINIC | Age: 28
End: 2023-03-06
Payer: COMMERCIAL

## 2023-03-06 PROCEDURE — 99215 OFFICE O/P EST HI 40 MIN: CPT | Mod: 95

## 2023-03-06 NOTE — PHYSICAL EXAM
[Alert] : alert [Well Nourished] : well nourished [Healthy Appearance] : healthy appearance [No Acute Distress] : no acute distress [Well Developed] : well developed [Normal Voice/Communication] : normal voice communication [Normal Pupil & Iris Size/Symmetry] : normal pupil and iris size and symmetry [No Discharge] : no discharge [Normal TMs] : both tympanic membranes were normal [Normal Nasal Mucosa] : the nasal mucosa was normal [Normal Lips/Tongue] : the lips and tongue were normal [Normal Tonsils] : normal tonsils [Normal Rate and Effort] : normal respiratory rhythm and effort [Normal Percussion] : normal percussion

## 2023-03-13 LAB
25(OH)D3 SERPL-MCNC: 64 NG/ML
ALBUMIN SERPL ELPH-MCNC: 4.4 G/DL
ALP BLD-CCNC: 102 U/L
ALT SERPL-CCNC: 13 U/L
ANION GAP SERPL CALC-SCNC: 11 MMOL/L
ANTI-MUELLERIAN HORMONE: 8.57 NG/ML
APPEARANCE: ABNORMAL
AST SERPL-CCNC: 12 U/L
BASOPHILS # BLD AUTO: 0.04 K/UL
BASOPHILS NFR BLD AUTO: 0.7 %
BILIRUB SERPL-MCNC: 0.4 MG/DL
BILIRUBIN URINE: NEGATIVE
BLOOD URINE: NEGATIVE
BUN SERPL-MCNC: 13 MG/DL
CALCIUM SERPL-MCNC: 9.5 MG/DL
CHLORIDE SERPL-SCNC: 104 MMOL/L
CHOLEST SERPL-MCNC: 192 MG/DL
CO2 SERPL-SCNC: 25 MMOL/L
COLOR: NORMAL
CREAT SERPL-MCNC: 0.94 MG/DL
DHEA-SULFATE, SERUM: 314 UG/DL
EGFR: 113 ML/MIN/1.73M2
EOSINOPHIL # BLD AUTO: 0.18 K/UL
EOSINOPHIL NFR BLD AUTO: 3.2 %
ESTIMATED AVERAGE GLUCOSE: 117 MG/DL
ESTRADIOL SERPL-MCNC: 18 PG/ML
GLUCOSE QUALITATIVE U: NEGATIVE
GLUCOSE SERPL-MCNC: 79 MG/DL
HBA1C MFR BLD HPLC: 5.7 %
HCT VFR BLD CALC: 45.9 %
HDLC SERPL-MCNC: 57 MG/DL
HGB BLD-MCNC: 14.5 G/DL
HIV1+2 AB SPEC QL IA.RAPID: NONREACTIVE
IMM GRANULOCYTES NFR BLD AUTO: 0.4 %
KETONES URINE: NEGATIVE
LDLC SERPL CALC-MCNC: 116 MG/DL
LEUKOCYTE ESTERASE URINE: NEGATIVE
LYMPHOCYTES # BLD AUTO: 1.59 K/UL
LYMPHOCYTES NFR BLD AUTO: 28.3 %
MAN DIFF?: NORMAL
MCHC RBC-ENTMCNC: 27.9 PG
MCHC RBC-ENTMCNC: 31.6 GM/DL
MCV RBC AUTO: 88.3 FL
MONOCYTES # BLD AUTO: 0.38 K/UL
MONOCYTES NFR BLD AUTO: 6.8 %
NEUTROPHILS # BLD AUTO: 3.4 K/UL
NEUTROPHILS NFR BLD AUTO: 60.6 %
NITRITE URINE: NEGATIVE
NONHDLC SERPL-MCNC: 135 MG/DL
PH URINE: 7.5
PLATELET # BLD AUTO: 290 K/UL
POTASSIUM SERPL-SCNC: 4.2 MMOL/L
PROT SERPL-MCNC: 7 G/DL
PROTEIN URINE: NORMAL
RBC # BLD: 5.2 M/UL
RBC # FLD: 13.1 %
SHBG SERPL-SCNC: 10.8 NMOL/L
SODIUM SERPL-SCNC: 141 MMOL/L
SPECIFIC GRAVITY URINE: 1.01
TESTOST SERPL-MCNC: 322 NG/DL
TRIGL SERPL-MCNC: 92 MG/DL
TSH SERPL-ACNC: 1.42 UIU/ML
UROBILINOGEN URINE: NORMAL
WBC # FLD AUTO: 5.61 K/UL

## 2023-03-13 NOTE — HISTORY OF PRESENT ILLNESS
[Home] : at home, [unfilled] , at the time of the visit. [Medical Office: ([facility])___] : at the medical office located in  [Verbal consent obtained from patient] : the patient, [unfilled] [FreeTextEntry1] : KATHY TERRAZAS is a 28 year old, male seen on 3/6/2023 for    Trans Care followup.\par \par Pt had leg legthening in Oct 2021 and finished in Feb 2022.   Can walk without assistance now.  Stopped taking Xerelto .  He had bilateral tibial legnthening.  5.5 cm bilaterally. Bone broken and rods placed with healing of lesions.  Occured Nov 2021 through Feb 2022 as he was monitored in PT.  Dr Connelly in [place].  Still in a wheel chair; xray showing near full healing. Cleared for weight bearing, but not intense exercise.  No formal PT now.   Will followup with Dr. Connelly with xrays until cleared.   Pt is now ambulating without assistance and is doing well. Had rods removed from his legs from the surgery last week with Dr. Connelly in [place].  Now back in [place].  No complications post operative.  Pt is now fully ambulating without asistance and has been cleread by Dr. Connelly.   He is now 2 inchs taller after his leg legnthening surgery.  Hard to climb stairs still. \par \par The Pt was taking Testosterone cypionate 200 mg/ml at 0.25 ml weekly, and his testosterone level was 600-700 and Hgb was 16.6 mg/dl. His endocrinologist changed dose to testosterone cypionate 200 mg/ml 0.20 ml weekly, and intially the Hgb decreased, but his testosterone level was closer to 300-400. So they increased levels back to 200 mg/ml 0.23 ml weekly, and he has been donating blood once every 2 months for the past 6 months prior to July 2021.  \par \par On Friday, Nov 17, 2022 Testostrone level was 719; Hgb was 15.1 g/dl  was Last dose of testsoen was on Nov 14, 2022 (3 days prior to labs)\par Pt is injectiong 0.25 ml every week of Testosterone cypronate 200 mg/ml weekly.\par No problems with the injections.\par Last injection was on Wednesday Nov 14 2022\par Last donated blood: Oct 2022\par \par Pt is injectiong 0.25 ml every week of Testosterone cypronate 200 mg/ml weekly. Last testosteorne injection Wend/Thurs 2/22-2/23 night before labwork which was on Monday 2/27/23  pm (4.5-5 days after injection). Total testosterone = 322.  Bioavailable testosterone 242 ng/dL  =  75.1 %  Currently on 0.25 ml \par \par Was in [place] for 2 months.  \par

## 2023-03-13 NOTE — SOCIAL HISTORY
[Sexually Active] : The patient is sexually active [Monogamous] : monogamous [Never] : never [Oral-Receptive (pt. mouth)] : oral-receptive (pt. mouth) [To Pt Genitalia] : pt genitalia [Female ___] : [unfilled] female [Date of most recent sexual encounter ___] : ~His/Her~ most recent sexual encounter was [unfilled] [Partnership for Health – Safe Sex Evidence Based Intervention] : The Partnership for Health Safe Sex Evidence Based Intervention was applied [Loss Frame Message] :  and a loss frame message was provided. [de-identified] : monogmous with wife x 2 years

## 2023-06-12 ENCOUNTER — APPOINTMENT (OUTPATIENT)
Dept: TRANSGENDER CARE | Facility: CLINIC | Age: 28
End: 2023-06-12
Payer: COMMERCIAL

## 2023-06-12 VITALS
HEIGHT: 70 IN | OXYGEN SATURATION: 97 % | SYSTOLIC BLOOD PRESSURE: 116 MMHG | RESPIRATION RATE: 18 BRPM | TEMPERATURE: 98.7 F | DIASTOLIC BLOOD PRESSURE: 80 MMHG | WEIGHT: 152 LBS | HEART RATE: 85 BPM | BODY MASS INDEX: 21.76 KG/M2

## 2023-06-12 DIAGNOSIS — L64.9 ANDROGENIC ALOPECIA, UNSPECIFIED: ICD-10-CM

## 2023-06-12 PROCEDURE — 99215 OFFICE O/P EST HI 40 MIN: CPT | Mod: 25

## 2023-06-12 PROCEDURE — 36415 COLL VENOUS BLD VENIPUNCTURE: CPT

## 2023-06-13 LAB
ALBUMIN SERPL ELPH-MCNC: 4.5 G/DL
ALBUMIN SERPL ELPH-MCNC: 4.9 G/DL
ALP BLD-CCNC: 130 U/L
ALP BLD-CCNC: 136 U/L
ALT SERPL-CCNC: 17 U/L
ALT SERPL-CCNC: 17 U/L
ANION GAP SERPL CALC-SCNC: 12 MMOL/L
ANION GAP SERPL CALC-SCNC: 12 MMOL/L
AST SERPL-CCNC: 16 U/L
AST SERPL-CCNC: 21 U/L
BILIRUB SERPL-MCNC: 0.3 MG/DL
BILIRUB SERPL-MCNC: 0.3 MG/DL
BUN SERPL-MCNC: 9 MG/DL
BUN SERPL-MCNC: 9 MG/DL
CALCIUM SERPL-MCNC: 9.4 MG/DL
CALCIUM SERPL-MCNC: 9.6 MG/DL
CHLORIDE SERPL-SCNC: 104 MMOL/L
CHLORIDE SERPL-SCNC: 105 MMOL/L
CHOLEST SERPL-MCNC: 191 MG/DL
CO2 SERPL-SCNC: 22 MMOL/L
CO2 SERPL-SCNC: 27 MMOL/L
CREAT SERPL-MCNC: 0.8 MG/DL
CREAT SERPL-MCNC: 0.9 MG/DL
EGFR: 119 ML/MIN/1.73M2
EGFR: 124 ML/MIN/1.73M2
ESTIMATED AVERAGE GLUCOSE: 117 MG/DL
ESTRADIOL SERPL-MCNC: 33 PG/ML
GLUCOSE SERPL-MCNC: 76 MG/DL
GLUCOSE SERPL-MCNC: 84 MG/DL
HBA1C MFR BLD HPLC: 5.7 %
HDLC SERPL-MCNC: 52 MG/DL
LDLC SERPL CALC-MCNC: 126 MG/DL
NONHDLC SERPL-MCNC: 139 MG/DL
POTASSIUM SERPL-SCNC: 4.2 MMOL/L
POTASSIUM SERPL-SCNC: 4.9 MMOL/L
PROT SERPL-MCNC: 7.2 G/DL
PROT SERPL-MCNC: 7.7 G/DL
SODIUM SERPL-SCNC: 140 MMOL/L
SODIUM SERPL-SCNC: 143 MMOL/L
TESTOST SERPL-MCNC: 580 NG/DL
TRIGL SERPL-MCNC: 63 MG/DL

## 2023-06-21 NOTE — HISTORY OF PRESENT ILLNESS
[FreeTextEntry1] : KATHY TERRAZAS is a 28 year old, male seen on 06/12/2023 for    Trans Care followup.\par \par Pt had leg lengthening in Oct 2021 and finished in Feb 2022.   Can walk without assistance now.  Stopped taking Xerelto .  He had bilateral tibial lengthening.  5.5 cm bilaterally. Bone broken and rods placed with healing of lesions.  Occurred Nov 2021 through Feb 2022 as he was monitored in PT.  Dr Connelly in [place].  Still in a wheel chair; xray showing near full healing. Cleared for weight bearing, but not intense exercise.  No formal PT now.   Will followup with Dr. Connelly with xrays until cleared.   Pt is now ambulating without assistance and is doing well. Had rods removed from his legs from the surgery last week with Dr. Connelly in [place].  Now back in [place].  No complications post operative.  Pt is now fully ambulating without assistance and has been cleared by Dr. Connelly.   He is now 2 inchs taller after his leg legnthening surgery.  Hard to climb stairs still. \par \par Pt had voice deepening surgery in [place] with Dr. Gwen Gomez (April 4, 2023)   Voice is deeper but scratchy still.  Pt is happy overall.  \par \par The Pt was taking Testosterone cypionate 200 mg/ml at 0.25 ml weekly, and his testosterone level was 600-700 and Hgb was 16.6 mg/dl. His endocrinologist changed dose to testosterone cypionate 200 mg/ml 0.20 ml weekly, and intially the Hgb decreased, but his testosterone level was closer to 300-400. So they increased levels back to 200 mg/ml 0.23 ml weekly, and he has been donating blood once every 2 months for the past 6 months prior to July 2021.   On Friday, Nov 17, 2022 Testosterone level was 719; Hgb was 15.1 g/dl  was Last dose of testsoen was on Nov 14, 2022 (3 days prior to labs)  \par \par Pt is injecting 0.27 ml every week of Testosterone cypronate 200 mg/ml weekly. No problems with the injections.  Last injection was on at [facility] bteen Thurs and Friday 6/8/23 to 6/9/23\par Last donated blood: April 2023.   The pt was expecting to donate blood again in ealry Jun 2023, but Hgb on 6/56/23 was 14.0, so pt did not donate blood.  \par  Total testosterone 580.  Currently on 0.27 ml \par \par Was in [place] for 2 months.

## 2023-06-21 NOTE — SOCIAL HISTORY
[Sexually Active] : The patient is sexually active [Monogamous] : monogamous [Never] : never [Oral-Receptive (pt. mouth)] : oral-receptive (pt. mouth) [To Pt Genitalia] : pt genitalia [Female ___] : [unfilled] female [Date of most recent sexual encounter ___] : ~His/Her~ most recent sexual encounter was [unfilled] [Partnership for Health – Safe Sex Evidence Based Intervention] : The Partnership for Health Safe Sex Evidence Based Intervention was applied [Loss Frame Message] :  and a loss frame message was provided. [de-identified] : broke up with wife about 3 years ago

## 2023-08-18 ENCOUNTER — APPOINTMENT (OUTPATIENT)
Dept: OTOLARYNGOLOGY | Facility: CLINIC | Age: 28
End: 2023-08-18

## 2023-10-16 ENCOUNTER — APPOINTMENT (OUTPATIENT)
Dept: TRANSGENDER CARE | Facility: CLINIC | Age: 28
End: 2023-10-16
Payer: COMMERCIAL

## 2023-10-16 PROCEDURE — 99215 OFFICE O/P EST HI 40 MIN: CPT | Mod: 95

## 2023-10-17 LAB
25(OH)D3 SERPL-MCNC: 46.8 NG/ML
ALBUMIN SERPL ELPH-MCNC: 4.6 G/DL
ALP BLD-CCNC: 97 U/L
ALT SERPL-CCNC: 16 U/L
ANION GAP SERPL CALC-SCNC: 10 MMOL/L
AST SERPL-CCNC: 16 U/L
BASOPHILS # BLD AUTO: 0.03 K/UL
BASOPHILS NFR BLD AUTO: 0.7 %
BILIRUB SERPL-MCNC: 0.5 MG/DL
BUN SERPL-MCNC: 10 MG/DL
CALCIUM SERPL-MCNC: 9.1 MG/DL
CHLORIDE SERPL-SCNC: 103 MMOL/L
CHOLEST SERPL-MCNC: 181 MG/DL
CO2 SERPL-SCNC: 27 MMOL/L
CREAT SERPL-MCNC: 1.02 MG/DL
EGFR: 103 ML/MIN/1.73M2
EOSINOPHIL # BLD AUTO: 0.05 K/UL
EOSINOPHIL NFR BLD AUTO: 1.2 %
ESTIMATED AVERAGE GLUCOSE: 111 MG/DL
ESTRADIOL SERPL-MCNC: 25 PG/ML
GLUCOSE SERPL-MCNC: 92 MG/DL
HBA1C MFR BLD HPLC: 5.5 %
HCT VFR BLD CALC: 49.4 %
HDLC SERPL-MCNC: 52 MG/DL
HGB BLD-MCNC: 15.5 G/DL
IMM GRANULOCYTES NFR BLD AUTO: 0.2 %
LDLC SERPL CALC-MCNC: 120 MG/DL
LYMPHOCYTES # BLD AUTO: 1.57 K/UL
LYMPHOCYTES NFR BLD AUTO: 36.5 %
MAN DIFF?: NORMAL
MCHC RBC-ENTMCNC: 28.1 PG
MCHC RBC-ENTMCNC: 31.4 GM/DL
MCV RBC AUTO: 89.7 FL
MONOCYTES # BLD AUTO: 0.22 K/UL
MONOCYTES NFR BLD AUTO: 5.1 %
NEUTROPHILS # BLD AUTO: 2.42 K/UL
NEUTROPHILS NFR BLD AUTO: 56.3 %
NONHDLC SERPL-MCNC: 129 MG/DL
PLATELET # BLD AUTO: 244 K/UL
POTASSIUM SERPL-SCNC: 4.5 MMOL/L
PROT SERPL-MCNC: 6.8 G/DL
RBC # BLD: 5.51 M/UL
RBC # FLD: 14.6 %
SODIUM SERPL-SCNC: 140 MMOL/L
TESTOST SERPL-MCNC: 495 NG/DL
TRIGL SERPL-MCNC: 45 MG/DL
TSH SERPL-ACNC: 1.06 UIU/ML
WBC # FLD AUTO: 4.3 K/UL

## 2024-03-07 LAB
25(OH)D3 SERPL-MCNC: 49.6 NG/ML
ALBUMIN SERPL ELPH-MCNC: 4.8 G/DL
ALP BLD-CCNC: 102 U/L
ALT SERPL-CCNC: 19 U/L
ANION GAP SERPL CALC-SCNC: 11 MMOL/L
AST SERPL-CCNC: 16 U/L
BASOPHILS # BLD AUTO: 0.06 K/UL
BASOPHILS NFR BLD AUTO: 0.9 %
BILIRUB SERPL-MCNC: 0.6 MG/DL
BUN SERPL-MCNC: 13 MG/DL
CALCIUM SERPL-MCNC: 9.8 MG/DL
CHLORIDE SERPL-SCNC: 100 MMOL/L
CHOLEST SERPL-MCNC: 180 MG/DL
CO2 SERPL-SCNC: 29 MMOL/L
CREAT SERPL-MCNC: 1.07 MG/DL
EGFR: 96 ML/MIN/1.73M2
EOSINOPHIL # BLD AUTO: 0.71 K/UL
EOSINOPHIL NFR BLD AUTO: 10.7 %
ESTIMATED AVERAGE GLUCOSE: 114 MG/DL
ESTRADIOL SERPL-MCNC: 24 PG/ML
GLUCOSE SERPL-MCNC: 86 MG/DL
HBA1C MFR BLD HPLC: 5.6 %
HCT VFR BLD CALC: 52.1 %
HDLC SERPL-MCNC: 43 MG/DL
HGB BLD-MCNC: 17.1 G/DL
IMM GRANULOCYTES NFR BLD AUTO: 0.2 %
LDLC SERPL CALC-MCNC: 120 MG/DL
LYMPHOCYTES # BLD AUTO: 2.64 K/UL
LYMPHOCYTES NFR BLD AUTO: 39.8 %
MAN DIFF?: NORMAL
MCHC RBC-ENTMCNC: 29.1 PG
MCHC RBC-ENTMCNC: 32.8 GM/DL
MCV RBC AUTO: 88.8 FL
MONOCYTES # BLD AUTO: 0.3 K/UL
MONOCYTES NFR BLD AUTO: 4.5 %
NEUTROPHILS # BLD AUTO: 2.92 K/UL
NEUTROPHILS NFR BLD AUTO: 43.9 %
NONHDLC SERPL-MCNC: 136 MG/DL
PLATELET # BLD AUTO: 228 K/UL
POTASSIUM SERPL-SCNC: 4 MMOL/L
PROT SERPL-MCNC: 7.3 G/DL
RBC # BLD: 5.87 M/UL
RBC # FLD: 12.8 %
SODIUM SERPL-SCNC: 140 MMOL/L
TESTOST SERPL-MCNC: 638 NG/DL
TRIGL SERPL-MCNC: 88 MG/DL
TSH SERPL-ACNC: 2.53 UIU/ML
WBC # FLD AUTO: 6.64 K/UL

## 2024-03-11 ENCOUNTER — APPOINTMENT (OUTPATIENT)
Dept: TRANSGENDER CARE | Facility: CLINIC | Age: 29
End: 2024-03-11
Payer: COMMERCIAL

## 2024-03-11 VITALS
DIASTOLIC BLOOD PRESSURE: 78 MMHG | HEART RATE: 100 BPM | TEMPERATURE: 98.3 F | WEIGHT: 148 LBS | OXYGEN SATURATION: 97 % | HEIGHT: 70 IN | BODY MASS INDEX: 21.19 KG/M2 | SYSTOLIC BLOOD PRESSURE: 110 MMHG

## 2024-03-11 DIAGNOSIS — Z11.4 ENCOUNTER FOR SCREENING FOR HUMAN IMMUNODEFICIENCY VIRUS [HIV]: ICD-10-CM

## 2024-03-11 DIAGNOSIS — Z11.3 ENCOUNTER FOR SCREENING FOR INFECTIONS WITH A PREDOMINANTLY SEXUAL MODE OF TRANSMISSION: ICD-10-CM

## 2024-03-11 DIAGNOSIS — G47.9 SLEEP DISORDER, UNSPECIFIED: ICD-10-CM

## 2024-03-11 DIAGNOSIS — Z86.39 PERSONAL HISTORY OF OTHER ENDOCRINE, NUTRITIONAL AND METABOLIC DISEASE: ICD-10-CM

## 2024-03-11 DIAGNOSIS — D75.1 SECONDARY POLYCYTHEMIA: ICD-10-CM

## 2024-03-11 DIAGNOSIS — Z78.9 OTHER SPECIFIED HEALTH STATUS: ICD-10-CM

## 2024-03-11 DIAGNOSIS — E78.5 HYPERLIPIDEMIA, UNSPECIFIED: ICD-10-CM

## 2024-03-11 DIAGNOSIS — F64.0 TRANSSEXUALISM: ICD-10-CM

## 2024-03-11 DIAGNOSIS — Z11.59 ENCOUNTER FOR SCREENING FOR OTHER VIRAL DISEASES: ICD-10-CM

## 2024-03-11 DIAGNOSIS — Z79.890 HORMONE REPLACEMENT THERAPY: ICD-10-CM

## 2024-03-11 PROCEDURE — G2211 COMPLEX E/M VISIT ADD ON: CPT | Mod: NC,1L

## 2024-03-11 PROCEDURE — 99215 OFFICE O/P EST HI 40 MIN: CPT

## 2024-03-11 PROCEDURE — 36415 COLL VENOUS BLD VENIPUNCTURE: CPT

## 2024-03-11 RX ORDER — ELECTROLYTES/DEXTROSE
10 SOLUTION, ORAL ORAL DAILY
Qty: 30 | Refills: 3 | Status: ACTIVE | COMMUNITY
Start: 2024-03-11 | End: 1900-01-01

## 2024-03-11 RX ORDER — TACROLIMUS 1 MG/G
0.1 OINTMENT TOPICAL
Qty: 30 | Refills: 0 | Status: ACTIVE | COMMUNITY
Start: 2023-11-30

## 2024-03-11 RX ORDER — TESTOSTERONE CYPIONATE 200 MG/ML
200 INJECTION, SOLUTION INTRAMUSCULAR
Qty: 4 | Refills: 0 | Status: ACTIVE | COMMUNITY
Start: 2017-09-26 | End: 1900-01-01

## 2024-03-11 NOTE — PHYSICAL EXAM
[Alert] : alert [Well Nourished] : well nourished [Healthy Appearance] : healthy appearance [No Acute Distress] : no acute distress [Normal Pupil & Iris Size/Symmetry] : normal pupil and iris size and symmetry [Well Developed] : well developed [No Discharge] : no discharge [No Photophobia] : no photophobia [Sclera Not Icteric] : sclera not icteric [Normal Nasal Mucosa] : the nasal mucosa was normal [Normal TMs] : both tympanic membranes were normal [Normal Lips/Tongue] : the lips and tongue were normal [Normal Outer Ear/Nose] : the ears and nose were normal in appearance [Normal Tonsils] : normal tonsils [No Thrush] : no thrush [Pale mucosa] : pale mucosa [Normal Rate and Effort] : normal respiratory rhythm and effort [Supple] : the neck was supple [No Retractions] : no retractions [No Crackles] : no crackles [Bilateral Audible Breath Sounds] : bilateral audible breath sounds [Normal Rate] : heart rate was normal  [Normal S1, S2] : normal S1 and S2 [No murmur] : no murmur [Regular Rhythm] : with a regular rhythm [Soft] : abdomen soft [Not Tender] : non-tender [Not Distended] : not distended [No HSM] : no hepato-splenomegaly [Normal Cervical Lymph Nodes] : cervical [Skin Intact] : skin intact  [No Rash] : no rash [No clubbing] : no clubbing [No Skin Lesions] : no skin lesions [No Edema] : no edema [No Cyanosis] : no cyanosis [Normal Mood] : mood was normal [Normal Affect] : affect was normal [Alert, Awake, Oriented as Age-Appropriate] : alert, awake, oriented as age appropriate

## 2024-03-13 NOTE — SOCIAL HISTORY
[Monogamous] : monogamous [Never] : never [Oral-Receptive (pt. mouth)] : oral-receptive (pt. mouth) [To Pt Genitalia] : pt genitalia [Partnership for Health – Safe Sex Evidence Based Intervention] : The Partnership for Health Safe Sex Evidence Based Intervention was applied [Loss Frame Message] :  and a loss frame message was provided. [Sexually Active] : The patient is not sexually active [de-identified] : broke up with wife about 3 years ago

## 2024-03-13 NOTE — HISTORY OF PRESENT ILLNESS
[FreeTextEntry1] : KATHY TERRAZAS is a 29 year old, male seen on 03/11/2024 for routine followoup for trans care. Doing well. No problmes.   Pt had leg lengthening in Oct 2021 and finished in Feb 2022.   Can walk without assistance now.  Stopped taking Xerelto .  He had bilateral tibial lengthening.  5.5 cm bilaterally. Bone broken and rods placed with healing of lesions.  Occurred Nov 2021 through Feb 2022 as he was monitored in PT.  Dr Connelly in [place].  Still in a wheel chair; xray showing near full healing. Cleared for weight bearing, but not intense exercise.  No formal PT now.   Will followup with Dr. Connelly with xrays until cleared.   Pt is now ambulating without assistance and is doing well. Had rods removed from his legs from the surgery last week with Dr. Connelly in [place].  Now back in [place].  No complications post operative.  Pt is now fully ambulating without assistance and has been cleared by Dr. Connelly.   He is now 2 inchs taller after his leg legnthening surgery.  Hard to climb stairs still.   10/23 Currently running, feeling well.   [facility] height: 5'10"  Pt had voice deepening surgery in [place] with Dr. Gwen Gomez (April 4, 2023)   Voice is deeper but scratchy still.  Pt is happy overall.    The Pt was taking Testosterone cypionate 200 mg/ml at 0.25 ml weekly, and his testosterone level was 600-700 and Hgb was 16.6 mg/dl. His endocrinologist changed dose to testosterone cypionate 200 mg/ml 0.20 ml weekly, and intially the Hgb decreased, but his testosterone level was closer to 300-400. So they increased levels back to 200 mg/ml 0.23 ml weekly, and he has been donating blood once every 2 months for the past 6 months prior to July 2021.   On Friday, Nov 17, 2022 Testosterone level was 719; Hgb was 15.1 g/dl  was Last dose of testsoen was on Oct 7/8, 2023 (2 days prior to labs drawn on Oct 11, 2023)    Pt is injecting 0.27 ml every week of Testosterone cypronate 200 mg/ml weekly. No problems with the injections.  Last injection was on at [facility] bteen Thurs and Friday 6/8/23 to 6/9/23 Last donated blood: April 2023.   The pt was expecting to donate blood again in eay Jun 2023, but Hgb on 6/56/23 was 14.0, so pt did not donate blood.    Pt's Hbt was 17.1 on 3/1/24  Total testosterone 638 ng/DL, injectsion on Monday/Tues . (Feb 26/27)  Currently on 0.27 ml  Testosterone cypronate  200 mg/ml Eating more vegetables and sweet potato recently.  Eating red meat some.   Not intersted in [facility] at htis time.  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    Pt was in [place] and was sleeping at 10 pm and woke up at 2 am, and slept at 7 am.  This was a few months ago. Now trying to get back to regular sleeping schedule.  ONly slept 3 hrs. Trans Care followup.  Exercise: Pt is walking an hr a day and goign to the gym once a week (1.5 hrs a week).   cc: diffultly sleeping.

## 2024-07-04 DIAGNOSIS — E78.5 HYPERLIPIDEMIA, UNSPECIFIED: ICD-10-CM

## 2024-07-22 ENCOUNTER — APPOINTMENT (OUTPATIENT)
Dept: TRANSGENDER CARE | Facility: CLINIC | Age: 29
End: 2024-07-22
Payer: COMMERCIAL

## 2024-07-22 DIAGNOSIS — Z11.4 ENCOUNTER FOR SCREENING FOR HUMAN IMMUNODEFICIENCY VIRUS [HIV]: ICD-10-CM

## 2024-07-22 DIAGNOSIS — F64.0 TRANSSEXUALISM: ICD-10-CM

## 2024-07-22 DIAGNOSIS — Z11.59 ENCOUNTER FOR SCREENING FOR OTHER VIRAL DISEASES: ICD-10-CM

## 2024-07-22 DIAGNOSIS — Z79.890 HORMONE REPLACEMENT THERAPY: ICD-10-CM

## 2024-07-22 DIAGNOSIS — Z86.39 PERSONAL HISTORY OF OTHER ENDOCRINE, NUTRITIONAL AND METABOLIC DISEASE: ICD-10-CM

## 2024-07-22 LAB
ALBUMIN SERPL ELPH-MCNC: 4.8 G/DL
ALP BLD-CCNC: 101 U/L
ALT SERPL-CCNC: 19 U/L
ANION GAP SERPL CALC-SCNC: 13 MMOL/L
AST SERPL-CCNC: 17 U/L
BASOPHILS # BLD AUTO: 0.05 K/UL
BASOPHILS NFR BLD AUTO: 0.9 %
BILIRUB SERPL-MCNC: 0.8 MG/DL
BUN SERPL-MCNC: 13 MG/DL
CALCIUM SERPL-MCNC: 9.7 MG/DL
CHLORIDE SERPL-SCNC: 100 MMOL/L
CHOLEST SERPL-MCNC: 203 MG/DL
CO2 SERPL-SCNC: 24 MMOL/L
CREAT SERPL-MCNC: 1.12 MG/DL
EGFR: 91 ML/MIN/1.73M2
EOSINOPHIL # BLD AUTO: 0.67 K/UL
EOSINOPHIL NFR BLD AUTO: 12.5 %
ESTIMATED AVERAGE GLUCOSE: 114 MG/DL
ESTRADIOL SERPL-MCNC: 32 PG/ML
GLUCOSE SERPL-MCNC: 86 MG/DL
HBA1C MFR BLD HPLC: 5.6 %
HCT VFR BLD CALC: 46.7 %
HDLC SERPL-MCNC: 61 MG/DL
HGB BLD-MCNC: 15.4 G/DL
IMM GRANULOCYTES NFR BLD AUTO: 0.4 %
LDLC SERPL CALC-MCNC: 130 MG/DL
LYMPHOCYTES # BLD AUTO: 1.84 K/UL
LYMPHOCYTES NFR BLD AUTO: 34.3 %
MAN DIFF?: NORMAL
MCHC RBC-ENTMCNC: 29.7 PG
MCHC RBC-ENTMCNC: 33 GM/DL
MCV RBC AUTO: 90.2 FL
MONOCYTES # BLD AUTO: 0.24 K/UL
MONOCYTES NFR BLD AUTO: 4.5 %
NEUTROPHILS # BLD AUTO: 2.54 K/UL
NEUTROPHILS NFR BLD AUTO: 47.4 %
NONHDLC SERPL-MCNC: 142 MG/DL
PLATELET # BLD AUTO: 255 K/UL
POTASSIUM SERPL-SCNC: 4.3 MMOL/L
PROT SERPL-MCNC: 7.3 G/DL
RBC # BLD: 5.18 M/UL
RBC # FLD: 12.4 %
SODIUM SERPL-SCNC: 136 MMOL/L
TESTOST SERPL-MCNC: 876 NG/DL
TRIGL SERPL-MCNC: 61 MG/DL
WBC # FLD AUTO: 5.36 K/UL

## 2024-07-22 PROCEDURE — 99215 OFFICE O/P EST HI 40 MIN: CPT | Mod: GC

## 2024-07-22 PROCEDURE — G2211 COMPLEX E/M VISIT ADD ON: CPT | Mod: NC

## 2024-07-22 RX ORDER — YEAST,DRIED (S. CEREVISIAE)
POWDER (GRAM) ORAL
Refills: 0 | Status: ACTIVE | COMMUNITY

## 2024-07-22 RX ORDER — YEAST,DRIED (S. CEREVISIAE)
40-5-3.3 POWDER (GRAM) ORAL
Refills: 0 | Status: ACTIVE | COMMUNITY

## 2024-07-22 RX ORDER — SODIUM FLUORIDE 6.1 MG/ML
1.1 PASTE, DENTIFRICE DENTAL
Qty: 100 | Refills: 0 | Status: ACTIVE | COMMUNITY
Start: 2024-03-19

## 2024-07-22 NOTE — REVIEW OF SYSTEMS
Patient and  here for FET injection teaching. Patient and  verbalizes understanding of doses and how to administer doses of lupron, estradiol valerate, ANNE, endometrin, doxycycline, medrol and prometrium. Reviewed calendar and timeline of FET calendar. Answered all of patient's questions. Encouraged to use online resources or call with any additional questions or concerns.    [Nl] : Genitourinary

## 2024-07-23 NOTE — HISTORY OF PRESENT ILLNESS
[FreeTextEntry1] : KATHY TERRAZAS is a 29 year old, male seen on 7/22/2024 for routine followoup for trans care. Doing well. No problmes.   Pt had leg lengthening in Oct 2021 and finished in Feb 2022.   Can walk without assistance now.  Stopped taking Xerelto .  He had bilateral tibial lengthening.  5.5 cm bilaterally. Bone broken and rods placed with healing of lesions.  Occurred Nov 2021 through Feb 2022 as he was monitored in PT.  [place] Connelly in [place].  Still in a wheel chair; xray showing near full healing. Cleared for weight bearing, but not intense exercise.  No formal PT now.   Will followup with Dr. Connelly with xrays until cleared.   Pt is now ambulating without assistance and is doing well. Had rods removed from his legs from the surgery last week with Dr. Connelly in [place].  Now back in [place].  No complications post operative.  Pt is now fully ambulating without assistance and has been cleared by Dr. Connelly.   He is now 2 inchs taller after his leg legnthening surgery.  Hard to climb stairs still.   10/23 Currently running, feeling well.   [facility] height: 5'10"  Pt had voice deepening surgery in [place] with Dr. Gwen Gomez (April 4, 2023)   Voice is deeper and has recovered with less 'stratchyness than before'.  Pt is happy overall.      Overall the patient feels very hapyp with presenation.   The Pt was taking Testosterone cypionate 200 mg/ml at 0.25 ml weekly, and his testosterone level was 600-700 and Hgb was 16.6 mg/dl. His endocrinologist changed dose to testosterone cypionate 200 mg/ml 0.20 ml weekly, and intially the Hgb decreased, but his testosterone level was closer to 300-400. So they increased levels back to 200 mg/ml 0.23 ml weekly, and he has been donating blood once every 2 months for the past 6 months prior to July 2021.   On Friday, Nov 17, 2022 Testosterone level was 719; Hgb was 15.1 g/dl  was Last dose of testsoen was on Oct 7/8, 2023 (2 days prior to labs drawn on Oct 11, 2023)  .   Pt is injecting 0.27 ml every week of Testosterone cypronate 200 mg/ml weekly. No problems with the injections.   Pt did injection on 7/9/24, and did labs on on 7/12/24, so levels were peaking at that itme.     Last donated blood: Jun 28, 2024.  Planning on donating again at the end of Aug 2024.    Total testosterone 876 ng/DL, injectsion on Monday/Tues, but instead id dit  Tues evening  Not intersted in [facility] at htis time.  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   Pt works at 1 pm-8 pm.  Exercise: Pt doing intnesely for 4 hrs a week at the gym for about 2 months. Going around 8 to 10 pm. Diet: eating more vegetables than beofore and more fish as well, with less red meat..

## 2024-07-23 NOTE — END OF VISIT
[Time Spent: ___ minutes] : I have spent [unfilled] minutes of time on the encounter. [] : Resident [FreeTextEntry3] : Pt seen with Dr. Mulvihill

## 2024-07-23 NOTE — REASON FOR VISIT
[Routine Follow-Up] : a routine follow-up visit for [Home] : at home, [unfilled] , at the time of the visit. [Medical Office: ([facility])___] : at the medical office located in  [Patient] : the patient

## 2024-07-23 NOTE — REASON FOR VISIT
[Routine Follow-Up] : a routine follow-up visit for [Home] : at home, [unfilled] , at the time of the visit. [Medical Office: ([facility])___] : at the medical office located in  [Patient] : the patient

## 2024-07-23 NOTE — HISTORY OF PRESENT ILLNESS
[FreeTextEntry1] : KATHY TERRAZAS is a 29 year old, male seen on 7/22/2024 for routine followoup for trans care. Doing well. No problmes.   Pt had leg lengthening in Oct 2021 and finished in Feb 2022.   Can walk without assistance now.  Stopped taking Xerelto .  He had bilateral tibial lengthening.  5.5 cm bilaterally. Bone broken and rods placed with healing of lesions.  Occurred Nov 2021 through Feb 2022 as he was monitored in PT.  Dr Connelly in [place].  Still in a wheel chair; xray showing near full healing. Cleared for weight bearing, but not intense exercise.  No formal PT now.   Will followup with Dr. Connelly with xrays until cleared.   Pt is now ambulating without assistance and is doing well. Had rods removed from his legs from the surgery last week with Dr. Connelly in [place].  Now back in [place].  No complications post operative.  Pt is now fully ambulating without assistance and has been cleared by Dr. Connelly.   He is now 2 inchs taller after his leg legnthening surgery.  Hard to climb stairs still.   10/23 Currently running, feeling well.   [facility] height: 5'10"  Pt had voice deepening surgery in [place] with Dr. Gwen Gomez (April 4, 2023)   Voice is deeper and has recovered with less 'stratchyness than before'.  Pt is happy overall.      Overall the patient feels very hapyp with presenation.   The Pt was taking Testosterone cypionate 200 mg/ml at 0.25 ml weekly, and his testosterone level was 600-700 and Hgb was 16.6 mg/dl. His endocrinologist changed dose to testosterone cypionate 200 mg/ml 0.20 ml weekly, and intially the Hgb decreased, but his testosterone level was closer to 300-400. So they increased levels back to 200 mg/ml 0.23 ml weekly, and he has been donating blood once every 2 months for the past 6 months prior to July 2021.   On Friday, Nov 17, 2022 Testosterone level was 719; Hgb was 15.1 g/dl  was Last dose of testsoen was on Oct 7/8, 2023 (2 days prior to labs drawn on Oct 11, 2023)  .   Pt is injecting 0.27 ml every week of Testosterone cypronate 200 mg/ml weekly. No problems with the injections.   Pt did injection on 7/9/24, and did labs on on 7/12/24, so levels were peaking at that itme.     Last donated blood: Jun 28, 2024.  Planning on donating again at the end of Aug 2024.    Total testosterone 876 ng/DL, injectsion on Monday/Tues, but instead id dit  Tues evening  Not intersted in [facility] at htis time.  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   Pt works at 1 pm-8 pm.  Exercise: Pt doing intnesely for 4 hrs a week at the gym for about 2 months. Going around 8 to 10 pm. Diet: eating more vegetables than beofore and more fish as well, with less red meat..

## 2024-12-10 DIAGNOSIS — D75.1 SECONDARY POLYCYTHEMIA: ICD-10-CM

## 2024-12-10 DIAGNOSIS — F64.0 TRANSSEXUALISM: ICD-10-CM

## 2024-12-16 LAB
ALBUMIN SERPL ELPH-MCNC: 4.8 G/DL
ALP BLD-CCNC: 89 U/L
ALT SERPL-CCNC: 21 U/L
ANION GAP SERPL CALC-SCNC: 12 MMOL/L
AST SERPL-CCNC: 19 U/L
BASOPHILS # BLD AUTO: 0.03 K/UL
BASOPHILS NFR BLD AUTO: 0.6 %
BILIRUB SERPL-MCNC: 0.5 MG/DL
BUN SERPL-MCNC: 11 MG/DL
CALCIUM SERPL-MCNC: 9.7 MG/DL
CHLORIDE SERPL-SCNC: 102 MMOL/L
CHOLEST SERPL-MCNC: 172 MG/DL
CO2 SERPL-SCNC: 28 MMOL/L
CREAT SERPL-MCNC: 1.04 MG/DL
EGFR: 100 ML/MIN/1.73M2
EOSINOPHIL # BLD AUTO: 0.39 K/UL
EOSINOPHIL NFR BLD AUTO: 7.6 %
ESTRADIOL SERPL-MCNC: 20 PG/ML
GLUCOSE SERPL-MCNC: 102 MG/DL
HCT VFR BLD CALC: 47.7 %
HDLC SERPL-MCNC: 62 MG/DL
HGB BLD-MCNC: 15.1 G/DL
IMM GRANULOCYTES NFR BLD AUTO: 0.2 %
LDLC SERPL CALC-MCNC: 101 MG/DL
LYMPHOCYTES # BLD AUTO: 1.92 K/UL
LYMPHOCYTES NFR BLD AUTO: 37.6 %
MAN DIFF?: NORMAL
MCHC RBC-ENTMCNC: 28.3 PG
MCHC RBC-ENTMCNC: 31.7 G/DL
MCV RBC AUTO: 89.5 FL
MONOCYTES # BLD AUTO: 0.35 K/UL
MONOCYTES NFR BLD AUTO: 6.8 %
NEUTROPHILS # BLD AUTO: 2.41 K/UL
NEUTROPHILS NFR BLD AUTO: 47.2 %
NONHDLC SERPL-MCNC: 111 MG/DL
PLATELET # BLD AUTO: 240 K/UL
POTASSIUM SERPL-SCNC: 4.9 MMOL/L
PROT SERPL-MCNC: 7.5 G/DL
RBC # BLD: 5.33 M/UL
RBC # FLD: 13.9 %
SODIUM SERPL-SCNC: 142 MMOL/L
TESTOST SERPL-MCNC: 309 NG/DL
TRIGL SERPL-MCNC: 50 MG/DL
WBC # FLD AUTO: 5.11 K/UL

## 2024-12-27 ENCOUNTER — APPOINTMENT (OUTPATIENT)
Dept: TRANSGENDER CARE | Facility: CLINIC | Age: 29
End: 2024-12-27
Payer: COMMERCIAL

## 2024-12-27 VITALS
OXYGEN SATURATION: 99 % | TEMPERATURE: 98.2 F | HEIGHT: 70 IN | SYSTOLIC BLOOD PRESSURE: 110 MMHG | BODY MASS INDEX: 22.33 KG/M2 | HEART RATE: 83 BPM | DIASTOLIC BLOOD PRESSURE: 84 MMHG | WEIGHT: 156 LBS

## 2024-12-27 DIAGNOSIS — E78.5 HYPERLIPIDEMIA, UNSPECIFIED: ICD-10-CM

## 2024-12-27 DIAGNOSIS — D75.1 SECONDARY POLYCYTHEMIA: ICD-10-CM

## 2024-12-27 DIAGNOSIS — F64.0 TRANSSEXUALISM: ICD-10-CM

## 2024-12-27 PROCEDURE — 99214 OFFICE O/P EST MOD 30 MIN: CPT

## 2024-12-27 PROCEDURE — G2211 COMPLEX E/M VISIT ADD ON: CPT | Mod: NC

## 2025-05-12 DIAGNOSIS — R73.03 PREDIABETES.: ICD-10-CM

## 2025-05-12 LAB
ALBUMIN SERPL ELPH-MCNC: 4.7 G/DL
ALP BLD-CCNC: 79 U/L
ALT SERPL-CCNC: 18 U/L
ANION GAP SERPL CALC-SCNC: 12 MMOL/L
ANTI-MUELLERIAN HORMONE: 4.57 NG/ML
APPEARANCE: CLEAR
AST SERPL-CCNC: 15 U/L
BASOPHILS # BLD AUTO: 0.03 K/UL
BASOPHILS NFR BLD AUTO: 0.7 %
BILIRUB SERPL-MCNC: 0.8 MG/DL
BILIRUBIN URINE: NEGATIVE
BLOOD URINE: NEGATIVE
BUN SERPL-MCNC: 13 MG/DL
CALCIUM SERPL-MCNC: 9.6 MG/DL
CHLORIDE SERPL-SCNC: 101 MMOL/L
CHOLEST SERPL-MCNC: 194 MG/DL
CO2 SERPL-SCNC: 24 MMOL/L
COLOR: YELLOW
CREAT SERPL-MCNC: 0.96 MG/DL
EGFRCR SERPLBLD CKD-EPI 2021: 109 ML/MIN/1.73M2
EOSINOPHIL # BLD AUTO: 0.22 K/UL
EOSINOPHIL NFR BLD AUTO: 5 %
ESTIMATED AVERAGE GLUCOSE: 117 MG/DL
ESTRADIOL SERPL-MCNC: 11 PG/ML
FSH SERPL-MCNC: 0.6 IU/L
GLUCOSE QUALITATIVE U: NEGATIVE MG/DL
GLUCOSE SERPL-MCNC: 92 MG/DL
HBA1C MFR BLD HPLC: 5.7 %
HCT VFR BLD CALC: 49.5 %
HDLC SERPL-MCNC: 52 MG/DL
HGB BLD-MCNC: 15.7 G/DL
IMM GRANULOCYTES NFR BLD AUTO: 0 %
KETONES URINE: NEGATIVE MG/DL
LDLC SERPL-MCNC: 131 MG/DL
LEUKOCYTE ESTERASE URINE: NEGATIVE
LH SERPL-ACNC: <0.3 IU/L
LYMPHOCYTES # BLD AUTO: 1.34 K/UL
LYMPHOCYTES NFR BLD AUTO: 30.3 %
MAN DIFF?: NORMAL
MCHC RBC-ENTMCNC: 28.6 PG
MCHC RBC-ENTMCNC: 31.7 G/DL
MCV RBC AUTO: 90.2 FL
MONOCYTES # BLD AUTO: 0.18 K/UL
MONOCYTES NFR BLD AUTO: 4.1 %
NEUTROPHILS # BLD AUTO: 2.65 K/UL
NEUTROPHILS NFR BLD AUTO: 59.9 %
NITRITE URINE: NEGATIVE
NONHDLC SERPL-MCNC: 142 MG/DL
PH URINE: 7.5
PLATELET # BLD AUTO: 260 K/UL
POTASSIUM SERPL-SCNC: 4.2 MMOL/L
PROT SERPL-MCNC: 7.2 G/DL
PROTEIN URINE: NEGATIVE MG/DL
RBC # BLD: 5.49 M/UL
RBC # FLD: 14.5 %
SODIUM SERPL-SCNC: 137 MMOL/L
SPECIFIC GRAVITY URINE: <1.005
T PALLIDUM AB SER QL IA: NEGATIVE
TESTOST SERPL-MCNC: 643 NG/DL
TRIGL SERPL-MCNC: 58 MG/DL
TSH SERPL-ACNC: 0.97 UIU/ML
UROBILINOGEN URINE: 0.2 MG/DL
WBC # FLD AUTO: 4.42 K/UL

## 2025-05-16 ENCOUNTER — APPOINTMENT (OUTPATIENT)
Dept: TRANSGENDER CARE | Facility: CLINIC | Age: 30
End: 2025-05-16
Payer: COMMERCIAL

## 2025-05-16 DIAGNOSIS — E78.5 HYPERLIPIDEMIA, UNSPECIFIED: ICD-10-CM

## 2025-05-16 DIAGNOSIS — D75.1 SECONDARY POLYCYTHEMIA: ICD-10-CM

## 2025-05-16 DIAGNOSIS — Z86.39 PERSONAL HISTORY OF OTHER ENDOCRINE, NUTRITIONAL AND METABOLIC DISEASE: ICD-10-CM

## 2025-05-16 DIAGNOSIS — Z78.9 OTHER SPECIFIED HEALTH STATUS: ICD-10-CM

## 2025-05-16 DIAGNOSIS — F64.0 TRANSSEXUALISM: ICD-10-CM

## 2025-05-16 PROCEDURE — 99215 OFFICE O/P EST HI 40 MIN: CPT | Mod: 95

## 2025-05-16 PROCEDURE — G2211 COMPLEX E/M VISIT ADD ON: CPT | Mod: NC,95
